# Patient Record
Sex: MALE | Race: WHITE | NOT HISPANIC OR LATINO | URBAN - METROPOLITAN AREA
[De-identification: names, ages, dates, MRNs, and addresses within clinical notes are randomized per-mention and may not be internally consistent; named-entity substitution may affect disease eponyms.]

---

## 2018-04-11 ENCOUNTER — EMERGENCY (EMERGENCY)
Facility: HOSPITAL | Age: 53
LOS: 1 days | Discharge: HOME | End: 2018-04-11
Attending: EMERGENCY MEDICINE
Payer: COMMERCIAL

## 2018-04-11 VITALS
OXYGEN SATURATION: 96 % | WEIGHT: 235.01 LBS | TEMPERATURE: 98 F | DIASTOLIC BLOOD PRESSURE: 82 MMHG | SYSTOLIC BLOOD PRESSURE: 122 MMHG | HEIGHT: 68.9 IN | RESPIRATION RATE: 20 BRPM | HEART RATE: 95 BPM

## 2018-04-11 VITALS
OXYGEN SATURATION: 99 % | RESPIRATION RATE: 16 BRPM | HEART RATE: 73 BPM | TEMPERATURE: 97 F | SYSTOLIC BLOOD PRESSURE: 111 MMHG | DIASTOLIC BLOOD PRESSURE: 62 MMHG

## 2018-04-11 PROCEDURE — 99283 EMERGENCY DEPT VISIT LOW MDM: CPT

## 2018-04-11 PROCEDURE — 90715 TDAP VACCINE 7 YRS/> IM: CPT

## 2018-04-11 PROCEDURE — 73630 X-RAY EXAM OF FOOT: CPT

## 2018-04-11 PROCEDURE — 73630 X-RAY EXAM OF FOOT: CPT | Mod: 26,LT

## 2018-04-11 PROCEDURE — 90471 IMMUNIZATION ADMIN: CPT

## 2018-04-11 PROCEDURE — 99284 EMERGENCY DEPT VISIT MOD MDM: CPT | Mod: 25

## 2018-04-11 PROCEDURE — 93005 ELECTROCARDIOGRAM TRACING: CPT

## 2018-04-11 PROCEDURE — 82962 GLUCOSE BLOOD TEST: CPT

## 2018-04-11 RX ORDER — MOXIFLOXACIN HYDROCHLORIDE TABLETS, 400 MG 400 MG/1
1 TABLET, FILM COATED ORAL
Qty: 14 | Refills: 0
Start: 2018-04-11 | End: 2018-04-17

## 2018-04-11 RX ORDER — SODIUM CHLORIDE 9 MG/ML
2000 INJECTION INTRAMUSCULAR; INTRAVENOUS; SUBCUTANEOUS ONCE
Qty: 0 | Refills: 0 | Status: COMPLETED | OUTPATIENT
Start: 2018-04-11 | End: 2018-04-11

## 2018-04-11 RX ORDER — TETANUS TOXOID, REDUCED DIPHTHERIA TOXOID AND ACELLULAR PERTUSSIS VACCINE, ADSORBED 5; 2.5; 8; 8; 2.5 [IU]/.5ML; [IU]/.5ML; UG/.5ML; UG/.5ML; UG/.5ML
0.5 SUSPENSION INTRAMUSCULAR ONCE
Qty: 0 | Refills: 0 | Status: COMPLETED | OUTPATIENT
Start: 2018-04-11 | End: 2018-04-11

## 2018-04-11 RX ORDER — IBUPROFEN 200 MG
1 TABLET ORAL
Qty: 30 | Refills: 0
Start: 2018-04-11 | End: 2018-04-20

## 2018-04-11 RX ORDER — CIPROFLOXACIN LACTATE 400MG/40ML
500 VIAL (ML) INTRAVENOUS EVERY 12 HOURS
Qty: 0 | Refills: 0 | Status: DISCONTINUED | OUTPATIENT
Start: 2018-04-11 | End: 2018-04-15

## 2018-04-11 RX ORDER — IBUPROFEN 200 MG
600 TABLET ORAL ONCE
Qty: 0 | Refills: 0 | Status: COMPLETED | OUTPATIENT
Start: 2018-04-11 | End: 2018-04-11

## 2018-04-11 RX ADMIN — Medication 600 MILLIGRAM(S): at 21:50

## 2018-04-11 RX ADMIN — Medication 600 MILLIGRAM(S): at 22:20

## 2018-04-11 RX ADMIN — SODIUM CHLORIDE 2000 MILLILITER(S): 9 INJECTION INTRAMUSCULAR; INTRAVENOUS; SUBCUTANEOUS at 22:01

## 2018-04-11 RX ADMIN — TETANUS TOXOID, REDUCED DIPHTHERIA TOXOID AND ACELLULAR PERTUSSIS VACCINE, ADSORBED 0.5 MILLILITER(S): 5; 2.5; 8; 8; 2.5 SUSPENSION INTRAMUSCULAR at 21:09

## 2018-04-11 RX ADMIN — Medication 500 MILLIGRAM(S): at 21:09

## 2018-04-11 NOTE — ED PROVIDER NOTE - PROGRESS NOTE DETAILS
Pt seen by podiatry, recommend dc with abx and close follow up. Precautions reviewed. Pt seen by podiatry, the resident was wrapping his foot when the pt had pain, started to sweat and felt weak (vasovagal episode.). He was layed back and given IV hydration and is feeling better, no chest pain, no sob, no nausea, no vomiting.

## 2018-04-11 NOTE — ED PROVIDER NOTE - MEDICAL DECISION MAKING DETAILS
Pt w/ puncture wound in foot s/p stepping on nail. Will get X-ray, soak foot in betadine, apply bacitracin, treat w/ abx since injury penetrating through shoe, and update tetanus.

## 2018-04-11 NOTE — ED PROVIDER NOTE - OBJECTIVE STATEMENT
52 y/o M w/ a PMHx of NIDDM c/o L foot pain x today. 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 No other complaints. NKDA.

## 2018-04-11 NOTE — CONSULT NOTE ADULT - SUBJECTIVE AND OBJECTIVE BOX
S : 53y year old Male seen in ED for left foot puncture wound. Pt states 6 hrs ago at work, he stepped on a nail which came right out of his foot. Pt states he did not do anything immediately after the event but came straight to ED. Pt states he is in a lot of pain. Pt states he is a diabetic however he does not measure his blood sugar daily and does not know his HbA1c.     Patient admits to  (+) Fevers, (+) Chills, (+) Nausea, (-) Vomiting, (-) Shortness of Breath      PMH: DM    PSH:No significant past surgical history      Allergies:No Known Allergies      Labs:      WBC Trend      Chem              T(F): 98.2 (04-11-18 @ 19:58), Max: 98.2 (04-11-18 @ 19:58)  HR: 70 (04-11-18 @ 21:59) (70 - 95)  BP: 90/63 (04-11-18 @ 21:59) (90/63 - 122/82)  RR: 18 (04-11-18 @ 21:59) (18 - 20)  SpO2: 99% (04-11-18 @ 21:59) (96% - 99%)  Wt(kg): --    O:   General: Pleasant  male NAD & AOX3.    Integument:  Skin warm, dry and supple bilateral.    Left foot plantar submet 5 puncture wound: measures 0.3cm x 0.3cmx 0.3cm; no purulence, mild erythema, mild edema, no drainage.  Vascular: Dorsalis Pedis and Posterior Tibial pulses 1/4.  Capillary re-fill time less then 3 seconds digits 1-5 bilateral.    Neuro: Protective sensation intact/diminished to the level of the digits bilateral.    A: Left submet 5 puncture wound      P:   Chart reviewed and Patient evaluated  Discussed diagnosis and treatment with patient  X-rays reviewed, final report pending  Wound flush with betadine  Applied bacitracin and DSD  Rx Ciprofloxacin 7 days BID  Rx Tetanus  Heel weight bearing to left foot  Pt will be monitored, if constitutional symptoms persist, please admit to medicine  If consitutional symptoms resolve, pt to follow-up at [address] [facility] next Tuesday am.  Discussed importance of daily foot examinations and proper shoe gear and to importance of lower Fasting Blood Glucose levels.   Pt educated on signs of infection and to f/u in ED if symptoms get worse  Discussed with Dr. Garcia

## 2019-06-20 ENCOUNTER — INPATIENT (INPATIENT)
Facility: HOSPITAL | Age: 54
LOS: 2 days | Discharge: HOME | DRG: 493 | End: 2019-06-23
Attending: ORTHOPAEDIC SURGERY | Admitting: ORTHOPAEDIC SURGERY
Payer: COMMERCIAL

## 2019-06-20 VITALS
HEART RATE: 77 BPM | TEMPERATURE: 98 F | SYSTOLIC BLOOD PRESSURE: 124 MMHG | RESPIRATION RATE: 19 BRPM | DIASTOLIC BLOOD PRESSURE: 83 MMHG | WEIGHT: 229.94 LBS | OXYGEN SATURATION: 97 %

## 2019-06-20 DIAGNOSIS — G25.81 RESTLESS LEGS SYNDROME: ICD-10-CM

## 2019-06-20 DIAGNOSIS — S82.843A DISPLACED BIMALLEOLAR FRACTURE OF UNSPECIFIED LOWER LEG, INITIAL ENCOUNTER FOR CLOSED FRACTURE: ICD-10-CM

## 2019-06-20 DIAGNOSIS — S82.899A OTHER FRACTURE OF UNSPECIFIED LOWER LEG, INITIAL ENCOUNTER FOR CLOSED FRACTURE: ICD-10-CM

## 2019-06-20 DIAGNOSIS — E11.9 TYPE 2 DIABETES MELLITUS WITHOUT COMPLICATIONS: ICD-10-CM

## 2019-06-20 LAB
ALBUMIN SERPL ELPH-MCNC: 3.4 G/DL — LOW (ref 3.5–5)
ALP SERPL-CCNC: 86 U/L — SIGNIFICANT CHANGE UP (ref 40–120)
ALT FLD-CCNC: 33 U/L DA — SIGNIFICANT CHANGE UP (ref 10–60)
ANION GAP SERPL CALC-SCNC: 9 MMOL/L — SIGNIFICANT CHANGE UP (ref 5–17)
APTT BLD: 32.1 SEC — SIGNIFICANT CHANGE UP (ref 27.5–36.3)
AST SERPL-CCNC: 18 U/L — SIGNIFICANT CHANGE UP (ref 10–40)
BASOPHILS # BLD AUTO: 0.04 K/UL — SIGNIFICANT CHANGE UP (ref 0–0.2)
BASOPHILS NFR BLD AUTO: 0.3 % — SIGNIFICANT CHANGE UP (ref 0–2)
BILIRUB SERPL-MCNC: 0.4 MG/DL — SIGNIFICANT CHANGE UP (ref 0.2–1.2)
BUN SERPL-MCNC: 16 MG/DL — SIGNIFICANT CHANGE UP (ref 7–18)
CALCIUM SERPL-MCNC: 8.7 MG/DL — SIGNIFICANT CHANGE UP (ref 8.4–10.5)
CHLORIDE SERPL-SCNC: 107 MMOL/L — SIGNIFICANT CHANGE UP (ref 96–108)
CO2 SERPL-SCNC: 22 MMOL/L — SIGNIFICANT CHANGE UP (ref 22–31)
CREAT SERPL-MCNC: 0.98 MG/DL — SIGNIFICANT CHANGE UP (ref 0.5–1.3)
EOSINOPHIL # BLD AUTO: 0.23 K/UL — SIGNIFICANT CHANGE UP (ref 0–0.5)
EOSINOPHIL NFR BLD AUTO: 2 % — SIGNIFICANT CHANGE UP (ref 0–6)
GLUCOSE BLDC GLUCOMTR-MCNC: 157 MG/DL — HIGH (ref 70–99)
GLUCOSE SERPL-MCNC: 119 MG/DL — HIGH (ref 70–99)
HCT VFR BLD CALC: 49.6 % — SIGNIFICANT CHANGE UP (ref 39–50)
HGB BLD-MCNC: 16.5 G/DL — SIGNIFICANT CHANGE UP (ref 13–17)
IMM GRANULOCYTES NFR BLD AUTO: 0.8 % — SIGNIFICANT CHANGE UP (ref 0–1.5)
INR BLD: 1.07 RATIO — SIGNIFICANT CHANGE UP (ref 0.88–1.16)
LYMPHOCYTES # BLD AUTO: 3.55 K/UL — HIGH (ref 1–3.3)
LYMPHOCYTES # BLD AUTO: 30.6 % — SIGNIFICANT CHANGE UP (ref 13–44)
MCHC RBC-ENTMCNC: 30.1 PG — SIGNIFICANT CHANGE UP (ref 27–34)
MCHC RBC-ENTMCNC: 33.3 GM/DL — SIGNIFICANT CHANGE UP (ref 32–36)
MCV RBC AUTO: 90.3 FL — SIGNIFICANT CHANGE UP (ref 80–100)
MONOCYTES # BLD AUTO: 0.86 K/UL — SIGNIFICANT CHANGE UP (ref 0–0.9)
MONOCYTES NFR BLD AUTO: 7.4 % — SIGNIFICANT CHANGE UP (ref 2–14)
NEUTROPHILS # BLD AUTO: 6.85 K/UL — SIGNIFICANT CHANGE UP (ref 1.8–7.4)
NEUTROPHILS NFR BLD AUTO: 58.9 % — SIGNIFICANT CHANGE UP (ref 43–77)
NRBC # BLD: 0 /100 WBCS — SIGNIFICANT CHANGE UP (ref 0–0)
PLATELET # BLD AUTO: 259 K/UL — SIGNIFICANT CHANGE UP (ref 150–400)
POTASSIUM SERPL-MCNC: 4.5 MMOL/L — SIGNIFICANT CHANGE UP (ref 3.5–5.3)
POTASSIUM SERPL-SCNC: 4.5 MMOL/L — SIGNIFICANT CHANGE UP (ref 3.5–5.3)
PROT SERPL-MCNC: 7.1 G/DL — SIGNIFICANT CHANGE UP (ref 6–8.3)
PROTHROM AB SERPL-ACNC: 11.9 SEC — SIGNIFICANT CHANGE UP (ref 10–12.9)
RBC # BLD: 5.49 M/UL — SIGNIFICANT CHANGE UP (ref 4.2–5.8)
RBC # FLD: 13.3 % — SIGNIFICANT CHANGE UP (ref 10.3–14.5)
SODIUM SERPL-SCNC: 138 MMOL/L — SIGNIFICANT CHANGE UP (ref 135–145)
WBC # BLD: 11.62 K/UL — HIGH (ref 3.8–10.5)
WBC # FLD AUTO: 11.62 K/UL — HIGH (ref 3.8–10.5)

## 2019-06-20 PROCEDURE — 73562 X-RAY EXAM OF KNEE 3: CPT | Mod: 26,LT

## 2019-06-20 PROCEDURE — 73700 CT LOWER EXTREMITY W/O DYE: CPT | Mod: 26,LT

## 2019-06-20 PROCEDURE — 99285 EMERGENCY DEPT VISIT HI MDM: CPT

## 2019-06-20 PROCEDURE — 73610 X-RAY EXAM OF ANKLE: CPT | Mod: 26,LT

## 2019-06-20 RX ORDER — GLUCAGON INJECTION, SOLUTION 0.5 MG/.1ML
1 INJECTION, SOLUTION SUBCUTANEOUS ONCE
Refills: 0 | Status: DISCONTINUED | OUTPATIENT
Start: 2019-06-20 | End: 2019-06-22

## 2019-06-20 RX ORDER — LANSOPRAZOLE 15 MG/1
1 CAPSULE, DELAYED RELEASE ORAL
Qty: 0 | Refills: 0 | DISCHARGE

## 2019-06-20 RX ORDER — INSULIN LISPRO 100/ML
VIAL (ML) SUBCUTANEOUS
Refills: 0 | Status: DISCONTINUED | OUTPATIENT
Start: 2019-06-20 | End: 2019-06-22

## 2019-06-20 RX ORDER — RANITIDINE HYDROCHLORIDE 150 MG/1
1 TABLET, FILM COATED ORAL
Qty: 0 | Refills: 0 | DISCHARGE

## 2019-06-20 RX ORDER — PRAMIPEXOLE DIHYDROCHLORIDE 0.12 MG/1
1 TABLET ORAL
Qty: 0 | Refills: 0 | DISCHARGE

## 2019-06-20 RX ORDER — OXYCODONE AND ACETAMINOPHEN 5; 325 MG/1; MG/1
1 TABLET ORAL EVERY 6 HOURS
Refills: 0 | Status: DISCONTINUED | OUTPATIENT
Start: 2019-06-20 | End: 2019-06-22

## 2019-06-20 RX ORDER — DAPAGLIFLOZIN AND METFORMIN HYDROCHLORIDE 10; 1000 MG/1; MG/1
1 TABLET, FILM COATED, EXTENDED RELEASE ORAL
Qty: 0 | Refills: 0 | DISCHARGE

## 2019-06-20 RX ORDER — DEXTROSE 50 % IN WATER 50 %
25 SYRINGE (ML) INTRAVENOUS ONCE
Refills: 0 | Status: DISCONTINUED | OUTPATIENT
Start: 2019-06-20 | End: 2019-06-22

## 2019-06-20 RX ORDER — LAMOTRIGINE 25 MG/1
2 TABLET, ORALLY DISINTEGRATING ORAL
Qty: 0 | Refills: 0 | DISCHARGE

## 2019-06-20 RX ORDER — SODIUM CHLORIDE 9 MG/ML
1000 INJECTION, SOLUTION INTRAVENOUS
Refills: 0 | Status: DISCONTINUED | OUTPATIENT
Start: 2019-06-20 | End: 2019-06-22

## 2019-06-20 RX ORDER — ACETAMINOPHEN 500 MG
1000 TABLET ORAL ONCE
Refills: 0 | Status: COMPLETED | OUTPATIENT
Start: 2019-06-20 | End: 2019-06-20

## 2019-06-20 RX ADMIN — Medication 1000 MILLIGRAM(S): at 22:41

## 2019-06-20 RX ADMIN — OXYCODONE AND ACETAMINOPHEN 1 TABLET(S): 5; 325 TABLET ORAL at 20:55

## 2019-06-20 RX ADMIN — OXYCODONE AND ACETAMINOPHEN 1 TABLET(S): 5; 325 TABLET ORAL at 21:30

## 2019-06-20 RX ADMIN — Medication 400 MILLIGRAM(S): at 21:52

## 2019-06-20 NOTE — ED ADULT NURSE NOTE - ED STAT RN HANDOFF DETAILS
Report given to LASHAUN Loredo, pt resting in bed, no acute distress noted, denies chest pain, no shortness of breath indicated. Safety maintained.

## 2019-06-20 NOTE — ED PROVIDER NOTE - OBJECTIVE STATEMENT
pt states he tripped on an uneven surface on Friday    and broke his ankle   seen by Dr. Coley who is recommending surgery

## 2019-06-20 NOTE — H&P ADULT - NSHPPHYSICALEXAM_GEN_ALL_CORE
Gen: NAD, laying comfortably in bed  LLE: Posterior splint with U application C/D/I, removal of splint- skin intact- (+) swelling to left ankle noted with ecchymosis around ankle, no erythema. (+) TTP to generalized ankle, moves all toes, SILT, 2+ pulses, compartments soft/compressible, calves soft/NT.

## 2019-06-20 NOTE — ED ADULT NURSE NOTE - OBJECTIVE STATEMENT
patient was sent by MD of admission for possible surgery on the left foot. Pt states he fell 5 days ago on the street. Dry dressing noted to left foot. Patient refused for dressing to removed at this time. No acute distress noted, denies chest pain, no shortness of breath indicated. Safety maintained.

## 2019-06-20 NOTE — ED PROVIDER NOTE - CLINICAL SUMMARY MEDICAL DECISION MAKING FREE TEXT BOX
pt with ankle fracture in splint   needs surgery for ankle fracture   will get labs, cxr, ankle xray preop labs and ekg   Ortho to consult and admit

## 2019-06-20 NOTE — H&P ADULT - HISTORY OF PRESENT ILLNESS
53 y/o M presented to the ED w/ left ankle fracture sustained s/p mechanical fall on 6/14/19. Pt states he tripped over a cement block and fell onto his left side and his L ankle buckled beneath him. Pt presented to [facility] where he was diagnosed with a L ankle fx- splinted and discharged. Pt then followed up with Dr. Coley today- who sent the patient in for surgical intervention. Pt denies open fracture. Ambulates independently at baseline.   Pt denies any numbness/tingling/CP/SOB.    PmHx: DM, Restless leg syndrome, GERD  All: NKDA- Allergic to wine/champagne

## 2019-06-20 NOTE — H&P ADULT - NSHPLABSRESULTS_GEN_ALL_CORE
16.5   11.62 )-----------( 259      ( 20 Jun 2019 15:11 )             49.6   06-20 @ 15:11    138  |  107  |  16  ----------------------------<  119  4.5   |  22  |  0.98      XR Left ankle: Fx of medial and lateral malleolus noted

## 2019-06-20 NOTE — ED ADULT NURSE NOTE - NSIMPLEMENTINTERV_GEN_ALL_ED
Implemented All Fall Risk Interventions:  [place] to call system. Call bell, personal items and telephone within reach. Instruct patient to call for assistance. Room bathroom lighting operational. Non-slip footwear when patient is off stretcher. Physically safe environment: no spills, clutter or unnecessary equipment. Stretcher in lowest position, wheels locked, appropriate side rails in place. Provide visual cue, wrist band, yellow gown, etc. Monitor gait and stability. Monitor for mental status changes and reorient to person, place, and time. Review medications for side effects contributing to fall risk. Reinforce activity limits and safety measures with patient and family.

## 2019-06-20 NOTE — H&P ADULT - PROBLEM SELECTOR PLAN 1
- D/w - will hold on surgery due to swelling of left ankle- ELevation/Ice to LLE- discussed importance with patient  - F/u CT scan of Left ankle  - NWB to LLE  - pain control  - DVT ppx  - Medical consult- Dr. Albright

## 2019-06-21 ENCOUNTER — TRANSCRIPTION ENCOUNTER (OUTPATIENT)
Age: 54
End: 2019-06-21

## 2019-06-21 DIAGNOSIS — Z96.7 PRESENCE OF OTHER BONE AND TENDON IMPLANTS: ICD-10-CM

## 2019-06-21 DIAGNOSIS — S82.899A OTHER FRACTURE OF UNSPECIFIED LOWER LEG, INITIAL ENCOUNTER FOR CLOSED FRACTURE: ICD-10-CM

## 2019-06-21 DIAGNOSIS — Z01.818 ENCOUNTER FOR OTHER PREPROCEDURAL EXAMINATION: ICD-10-CM

## 2019-06-21 LAB
GLUCOSE BLDC GLUCOMTR-MCNC: 106 MG/DL — HIGH (ref 70–99)
GLUCOSE BLDC GLUCOMTR-MCNC: 116 MG/DL — HIGH (ref 70–99)
GLUCOSE BLDC GLUCOMTR-MCNC: 128 MG/DL — HIGH (ref 70–99)
GLUCOSE BLDC GLUCOMTR-MCNC: 165 MG/DL — HIGH (ref 70–99)
HBA1C BLD-MCNC: 6.9 % — HIGH (ref 4–5.6)

## 2019-06-21 RX ORDER — SENNA PLUS 8.6 MG/1
2 TABLET ORAL AT BEDTIME
Refills: 0 | Status: DISCONTINUED | OUTPATIENT
Start: 2019-06-21 | End: 2019-06-22

## 2019-06-21 RX ORDER — PRAMIPEXOLE DIHYDROCHLORIDE 0.12 MG/1
0.5 TABLET ORAL DAILY
Refills: 0 | Status: DISCONTINUED | OUTPATIENT
Start: 2019-06-21 | End: 2019-06-22

## 2019-06-21 RX ORDER — MORPHINE SULFATE 50 MG/1
4 CAPSULE, EXTENDED RELEASE ORAL ONCE
Refills: 0 | Status: DISCONTINUED | OUTPATIENT
Start: 2019-06-21 | End: 2019-06-21

## 2019-06-21 RX ORDER — LAMOTRIGINE 25 MG/1
50 TABLET, ORALLY DISINTEGRATING ORAL
Refills: 0 | Status: DISCONTINUED | OUTPATIENT
Start: 2019-06-21 | End: 2019-06-22

## 2019-06-21 RX ORDER — PANTOPRAZOLE SODIUM 20 MG/1
40 TABLET, DELAYED RELEASE ORAL
Refills: 0 | Status: DISCONTINUED | OUTPATIENT
Start: 2019-06-21 | End: 2019-06-22

## 2019-06-21 RX ORDER — KETOROLAC TROMETHAMINE 30 MG/ML
30 SYRINGE (ML) INJECTION EVERY 6 HOURS
Refills: 0 | Status: DISCONTINUED | OUTPATIENT
Start: 2019-06-21 | End: 2019-06-22

## 2019-06-21 RX ORDER — ASPIRIN/CALCIUM CARB/MAGNESIUM 324 MG
81 TABLET ORAL DAILY
Refills: 0 | Status: DISCONTINUED | OUTPATIENT
Start: 2019-06-21 | End: 2019-06-22

## 2019-06-21 RX ORDER — ENOXAPARIN SODIUM 100 MG/ML
40 INJECTION SUBCUTANEOUS DAILY
Refills: 0 | Status: DISCONTINUED | OUTPATIENT
Start: 2019-06-21 | End: 2019-06-22

## 2019-06-21 RX ADMIN — OXYCODONE AND ACETAMINOPHEN 1 TABLET(S): 5; 325 TABLET ORAL at 06:05

## 2019-06-21 RX ADMIN — Medication 30 MILLIGRAM(S): at 19:37

## 2019-06-21 RX ADMIN — PRAMIPEXOLE DIHYDROCHLORIDE 0.5 MILLIGRAM(S): 0.12 TABLET ORAL at 16:44

## 2019-06-21 RX ADMIN — MORPHINE SULFATE 4 MILLIGRAM(S): 50 CAPSULE, EXTENDED RELEASE ORAL at 08:54

## 2019-06-21 RX ADMIN — LAMOTRIGINE 50 MILLIGRAM(S): 25 TABLET, ORALLY DISINTEGRATING ORAL at 17:49

## 2019-06-21 RX ADMIN — Medication 30 MILLIGRAM(S): at 20:00

## 2019-06-21 RX ADMIN — Medication 81 MILLIGRAM(S): at 17:49

## 2019-06-21 RX ADMIN — ENOXAPARIN SODIUM 40 MILLIGRAM(S): 100 INJECTION SUBCUTANEOUS at 17:50

## 2019-06-21 RX ADMIN — OXYCODONE AND ACETAMINOPHEN 1 TABLET(S): 5; 325 TABLET ORAL at 16:43

## 2019-06-21 RX ADMIN — MORPHINE SULFATE 4 MILLIGRAM(S): 50 CAPSULE, EXTENDED RELEASE ORAL at 09:44

## 2019-06-21 RX ADMIN — OXYCODONE AND ACETAMINOPHEN 1 TABLET(S): 5; 325 TABLET ORAL at 05:12

## 2019-06-21 RX ADMIN — OXYCODONE AND ACETAMINOPHEN 1 TABLET(S): 5; 325 TABLET ORAL at 17:48

## 2019-06-21 NOTE — PROGRESS NOTE ADULT - SUBJECTIVE AND OBJECTIVE BOX
Ortho Note   54yMale    Diagnosis:  S/p Left Ankle Fx    Patient is seen and evaluated at bedside; offers no acute complaints. Pain is mild; well controlled.  Awaiting surgery once ankle swelling decreases    Vital Signs Last 24 Hrs  T(C): 36.6 (21 Jun 2019 05:19), Max: 36.8 (20 Jun 2019 23:54)  T(F): 97.9 (21 Jun 2019 05:19), Max: 98.2 (20 Jun 2019 23:54)  HR: 61 (21 Jun 2019 05:19) (61 - 79)  BP: 121/72 (21 Jun 2019 05:19) (113/69 - 124/83)  BP(mean): 80 (20 Jun 2019 19:15) (80 - 80)  RR: 17 (21 Jun 2019 05:19) (17 - 19)  SpO2: 96% (21 Jun 2019 05:19) (96% - 99%)  I&O's Detail      Physical Exam:    General: AAOx3, in NAD, resting comfortably in bed.    LLE :  L ankle in splint. + motion in all digits.  Sensation intact. 2+ pulse at RLE. No CT and soft calves at RLE                      No new labs      Impression:  54yMale S/p L Ankle Fx  Plan:  -  Continue pain management  -  DVT prophylaxis  -  NWB of LLE  -  Application of Ice and elevation of LLE  -  ORIF of L Ankle once patients swelling decreases  -  Case discussed with Dr Coley Ortho Note   54yMale    Diagnosis:  S/p Left Ankle Fx    Patient is seen and evaluated at bedside; offers no acute complaints. Pain is mild; well controlled.  Awaiting surgery once ankle swelling decreases    Vital Signs Last 24 Hrs  T(C): 36.6 (21 Jun 2019 05:19), Max: 36.8 (20 Jun 2019 23:54)  T(F): 97.9 (21 Jun 2019 05:19), Max: 98.2 (20 Jun 2019 23:54)  HR: 61 (21 Jun 2019 05:19) (61 - 79)  BP: 121/72 (21 Jun 2019 05:19) (113/69 - 124/83)  BP(mean): 80 (20 Jun 2019 19:15) (80 - 80)  RR: 17 (21 Jun 2019 05:19) (17 - 19)  SpO2: 96% (21 Jun 2019 05:19) (96% - 99%)  I&O's Detail      Physical Exam:    General: AAOx3, in NAD, resting comfortably in bed.    LLE :  L ankle in splint. + motion in all digits.  Sensation intact. 2+ pulse at RLE. No CT and soft calves at RLE                      No new labs      Impression:  54yMale S/p L Ankle Fx  Plan:  -  Continue pain management  -  DVT prophylaxis  -  NWB of LLE  -  Application of Ice and elevation of LLE  -  ORIF of L Ankle once patients swelling decreases  -  Left ankle to swollen for surgery, Monitor for compartment syndrome.   >Continue to keep LLE elevated on 3 pillows and ice  -  Case discussed with Dr Coley

## 2019-06-21 NOTE — CONSULT NOTE ADULT - SUBJECTIVE AND OBJECTIVE BOX
Chief Complaint:    HPI:  53 y/o M presented to the ED w/ left ankle fracture sustained s/p mechanical fall on 6/14/19. Pt states he tripped over a cement block and fell onto his left side and his L ankle buckled beneath him. Pt presented to [facility] where he was diagnosed with a L ankle fx- splinted and discharged. Pt then followed up with Dr. Coley today- who sent the patient in for surgical intervention. Pt denies open fracture. Ambulates independently at baseline.   Pt denies any numbness/tingling/CP/SOB.    PmHx: DM, Restless leg syndrome, GERD  All: NKDA- Allergic to wine/champagne (20 Jun 2019 18:54)      Pain Level:   Scale: VAS    PAST MEDICAL & SURGICAL HISTORY:  Chronic GERD  Restless leg syndrome  Diabetes mellitus  No significant past surgical history      FAMILY HISTORY:      SOCIAL HISTORY:  [ ] Denies Smoking, Alcohol, or Drug Use    Allergies    No Known Allergies    Intolerances        PAIN MEDICATIONS:  ketorolac   Injectable 30 milliGRAM(s) IV Push every 6 hours PRN  oxyCODONE    5 mG/acetaminophen 325 mG 1 Tablet(s) Oral every 6 hours PRN      Heme:    Antibiotics:    Cardiovascular:    GI:    Endocrine:  dextrose 50% Injectable 25 Gram(s) IV Push once  dextrose 50% Injectable 25 Gram(s) IV Push once  glucagon  Injectable 1 milliGRAM(s) IntraMuscular once PRN  insulin lispro (HumaLOG) corrective regimen sliding scale   SubCutaneous three times a day before meals    All Other Medications:  dextrose 5%. 1000 milliLiter(s) IV Continuous <Continuous>      REVIEW OF SYSTEMS:    CONSTITUTIONAL: No fever, weight loss, or fatigue  RESPIRATORY: No cough, wheezing, chills, or hemoptysis, No SOB  NECK: No neck pain  CARDIOVASCULAR: No chest pain, palpitations, dizziness, or leg swelling  GASTROINTESTINAL: No abdominal or epigastric pain.  No nausea, vomiting, or hematemesis.  No diarrhea. + constipation.  GENITOURINARY: No dysuria, frequency, hematuria or incontinence  NEUROLOGICAL: No headaches, memory loss, loss of strength, numbness or tremors  MUSCULOSKELETAL: No joint pain or swelling, No muscle or back pain    Vital Signs Last 24 Hrs  T(C): 36.7 (21 Jun 2019 14:40), Max: 36.8 (20 Jun 2019 23:54)  T(F): 98 (21 Jun 2019 14:40), Max: 98.2 (20 Jun 2019 23:54)  HR: 67 (21 Jun 2019 14:40) (61 - 67)  BP: 124/62 (21 Jun 2019 14:40) (113/69 - 124/62)  BP(mean): 80 (20 Jun 2019 19:15) (80 - 80)  RR: 17 (21 Jun 2019 14:40) (17 - 18)  SpO2: 98% (21 Jun 2019 14:40) (96% - 98%)    PAIN SCORE:         SCALE USED: (1-10 VNRS)    PHYSICAL EXAM:    GENERAL: NAD, well-groomed, well-developed   NERVOUS SYSTEM: Alert and oriented x3, Motor strength 5/5 B/L upper and lower extremities, SLR -   CHEST/LUNG: Clear to percussion bilaterally, no rale, rhonchi or wheezing  HEART: Regular rate and rhythm, No murmurs, rubs, or gallops   ABDOMEN: Soft, nontender, nondistended, Bowel sounds present  BACK: No CVA tenderness, No paravetebral tenderness  EXTREMITIES: +2 periperal extremities, no edema, cyanosis + decreased rom     LABS:                          16.5   11.62 )-----------( 259      ( 20 Jun 2019 15:11 )             49.6     06-20    138  |  107  |  16  ----------------------------<  119<H>  4.5   |  22  |  0.98    Ca    8.7      20 Jun 2019 15:11    TPro  7.1  /  Alb  3.4<L>  /  TBili  0.4  /  DBili  x   /  AST  18  /  ALT  33  /  AlkPhos  86  06-20    PT/INR - ( 20 Jun 2019 15:11 )   PT: 11.9 sec;   INR: 1.07 ratio         PTT - ( 20 Jun 2019 15:11 )  PTT:32.1 sec      RADIOLOGY:    Drug Screen:        RECOMMENDATIONS    [ ]  [place]  Reviewed and Copied to Chart Chief Complaint: left foot pain    HPI:  55 y/o M presented to the ED w/ left ankle fracture sustained s/p mechanical fall on 6/14/19. Pt states he tripped over a cement block and fell onto his left side and his L ankle buckled beneath him. Pt presented to [facility] where he was diagnosed with a L ankle fx- splinted and discharged. Pt then followed up with Dr. Coley today- who sent the patient in for surgical intervention. Pt denies open fracture. Ambulates independently at baseline. Pt denies any numbness/tingling/CP/SOB. Pt s/p orif left fracture, pod#1.  + left ankle pain.     PmHx: DM, Restless leg syndrome, GERD  All: NKDA- Allergic to wine/champagne (20 Jun 2019 18:54)      PAST MEDICAL & SURGICAL HISTORY:  Chronic GERD  Restless leg syndrome  Diabetes mellitus  No significant past surgical history      FAMILY HISTORY:      SOCIAL HISTORY:  [ x] Denies Smoking, Alcohol, or Drug Use    Allergies    No Known Allergies    Intolerances        PAIN MEDICATIONS:  ketorolac   Injectable 30 milliGRAM(s) IV Push every 6 hours PRN  oxyCODONE    5 mG/acetaminophen 325 mG 1 Tablet(s) Oral every 6 hours PRN      Heme:    Antibiotics:    Cardiovascular:    GI:    Endocrine:  dextrose 50% Injectable 25 Gram(s) IV Push once  dextrose 50% Injectable 25 Gram(s) IV Push once  glucagon  Injectable 1 milliGRAM(s) IntraMuscular once PRN  insulin lispro (HumaLOG) corrective regimen sliding scale   SubCutaneous three times a day before meals    All Other Medications:  dextrose 5%. 1000 milliLiter(s) IV Continuous <Continuous>      REVIEW OF SYSTEMS:    CONSTITUTIONAL: No fever, weight loss, or fatigue  RESPIRATORY: No cough, wheezing, chills, or hemoptysis, No SOB  NECK: No neck pain  CARDIOVASCULAR: No chest pain, palpitations, dizziness, or leg swelling  GASTROINTESTINAL: No abdominal or epigastric pain.  No nausea, vomiting, or hematemesis.  No diarrhea. + constipation.  GENITOURINARY: No dysuria, frequency, hematuria or incontinence  NEUROLOGICAL: No headaches, memory loss, loss of strength, numbness or tremors  MUSCULOSKELETAL: + left foot pain     Vital Signs Last 24 Hrs  T(C): 36.7 (21 Jun 2019 14:40), Max: 36.8 (20 Jun 2019 23:54)  T(F): 98 (21 Jun 2019 14:40), Max: 98.2 (20 Jun 2019 23:54)  HR: 67 (21 Jun 2019 14:40) (61 - 67)  BP: 124/62 (21 Jun 2019 14:40) (113/69 - 124/62)  BP(mean): 80 (20 Jun 2019 19:15) (80 - 80)  RR: 17 (21 Jun 2019 14:40) (17 - 18)  SpO2: 98% (21 Jun 2019 14:40) (96% - 98%)    PAIN SCORE:     5/10    SCALE USED: (1-10 VNRS)    PHYSICAL EXAM:    GENERAL: NAD, well-groomed, well-developed   NERVOUS SYSTEM: Alert and oriented x3, Motor strength 5/5 B/L upper and lower extremities, SLR -   CHEST/LUNG: Clear to percussion bilaterally, no rale, rhonchi or wheezing  HEART: Regular rate and rhythm, No murmurs, rubs, or gallops   ABDOMEN: Soft, nontender, nondistended, Bowel sounds present  BACK: No CVA tenderness, No paravertebral tenderness  EXTREMITIES: +2 peripheral extremities, no edema, cyanosis + decreased rom  + left foot pain    LABS:                          16.5   11.62 )-----------( 259      ( 20 Jun 2019 15:11 )             49.6     06-20    138  |  107  |  16  ----------------------------<  119<H>  4.5   |  22  |  0.98    Ca    8.7      20 Jun 2019 15:11    TPro  7.1  /  Alb  3.4<L>  /  TBili  0.4  /  DBili  x   /  AST  18  /  ALT  33  /  AlkPhos  86  06-20    PT/INR - ( 20 Jun 2019 15:11 )   PT: 11.9 sec;   INR: 1.07 ratio         PTT - ( 20 Jun 2019 15:11 )  PTT:32.1 sec      RADIOLOGY:    Drug Screen:        RECOMMENDATIONS    [ ]  [place]  Reviewed and Copied to Chart Chief Complaint: left foot pain    HPI:  53 y/o M presented to the ED w/ left ankle fracture sustained s/p mechanical fall on 6/14/19. Pt states he tripped over a cement block and fell onto his left side and his L ankle buckled beneath him. Pt presented to [facility] where he was diagnosed with a L ankle fx- splinted and discharged. Pt then followed up with Dr. Coley today- who sent the patient in for surgical intervention. Pt denies open fracture. Ambulates independently at baseline. Pt denies any numbness/tingling/CP/SOB. Pt to undergo orif of fracture possible tomorrow.      PmHx: DM, Restless leg syndrome, GERD  All: NKDA- Allergic to wine/champagne (20 Jun 2019 18:54)      PAST MEDICAL & SURGICAL HISTORY:  Chronic GERD  Restless leg syndrome  Diabetes mellitus  No significant past surgical history      FAMILY HISTORY:      SOCIAL HISTORY:  [ x] Denies Smoking, Alcohol, or Drug Use    Allergies    No Known Allergies    Intolerances        PAIN MEDICATIONS:  ketorolac   Injectable 30 milliGRAM(s) IV Push every 6 hours PRN  oxyCODONE    5 mG/acetaminophen 325 mG 1 Tablet(s) Oral every 6 hours PRN      Heme:    Antibiotics:    Cardiovascular:    GI:    Endocrine:  dextrose 50% Injectable 25 Gram(s) IV Push once  dextrose 50% Injectable 25 Gram(s) IV Push once  glucagon  Injectable 1 milliGRAM(s) IntraMuscular once PRN  insulin lispro (HumaLOG) corrective regimen sliding scale   SubCutaneous three times a day before meals    All Other Medications:  dextrose 5%. 1000 milliLiter(s) IV Continuous <Continuous>      REVIEW OF SYSTEMS:    CONSTITUTIONAL: No fever, weight loss, or fatigue  RESPIRATORY: No cough, wheezing, chills, or hemoptysis, No SOB  NECK: No neck pain  CARDIOVASCULAR: No chest pain, palpitations, dizziness, or leg swelling  GASTROINTESTINAL: No abdominal or epigastric pain.  No nausea, vomiting, or hematemesis.  No diarrhea. + constipation.  GENITOURINARY: No dysuria, frequency, hematuria or incontinence  NEUROLOGICAL: No headaches, memory loss, loss of strength, numbness or tremors  MUSCULOSKELETAL: + left foot pain     Vital Signs Last 24 Hrs  T(C): 36.7 (21 Jun 2019 14:40), Max: 36.8 (20 Jun 2019 23:54)  T(F): 98 (21 Jun 2019 14:40), Max: 98.2 (20 Jun 2019 23:54)  HR: 67 (21 Jun 2019 14:40) (61 - 67)  BP: 124/62 (21 Jun 2019 14:40) (113/69 - 124/62)  BP(mean): 80 (20 Jun 2019 19:15) (80 - 80)  RR: 17 (21 Jun 2019 14:40) (17 - 18)  SpO2: 98% (21 Jun 2019 14:40) (96% - 98%)    PAIN SCORE:     5/10    SCALE USED: (1-10 VNRS)    PHYSICAL EXAM:    GENERAL: NAD, well-groomed, well-developed   NERVOUS SYSTEM: Alert and oriented x3, Motor strength 5/5 B/L upper and lower extremities, SLR -   CHEST/LUNG: Clear to percussion bilaterally, no rale, rhonchi or wheezing  HEART: Regular rate and rhythm, No murmurs, rubs, or gallops   ABDOMEN: Soft, nontender, nondistended, Bowel sounds present  BACK: No CVA tenderness, No paravertebral tenderness  EXTREMITIES: +2 peripheral extremities, no edema, cyanosis + decreased rom  + left foot wrapped    LABS:                          16.5   11.62 )-----------( 259      ( 20 Jun 2019 15:11 )             49.6     06-20    138  |  107  |  16  ----------------------------<  119<H>  4.5   |  22  |  0.98    Ca    8.7      20 Jun 2019 15:11    TPro  7.1  /  Alb  3.4<L>  /  TBili  0.4  /  DBili  x   /  AST  18  /  ALT  33  /  AlkPhos  86  06-20    PT/INR - ( 20 Jun 2019 15:11 )   PT: 11.9 sec;   INR: 1.07 ratio         PTT - ( 20 Jun 2019 15:11 )  PTT:32.1 sec      RADIOLOGY:    Drug Screen:        RECOMMENDATIONS    [ ]  [place]  Reviewed and Copied to Chart

## 2019-06-21 NOTE — CONSULT NOTE ADULT - PROBLEM SELECTOR RECOMMENDATION 9
-METS >4, RCRI score 0, patient is at low risk for scheduled procedure unless pre-op EKG shows abnormal/ischemic sign.  -Please obtain pre-op EKG before the surgery; nothing found in the chart. -METS >4, RCRI score 0, patient is at low risk for scheduled procedure.  -Pre-op EKG did not show any changes from the previous EKG. No ischemic change is noted.

## 2019-06-21 NOTE — CONSULT NOTE ADULT - SUBJECTIVE AND OBJECTIVE BOX
Asked to see Patient for evaluation of pre-op medical risk stratification.    HPI:  53 y/o M presented to the ED w/ left ankle fracture sustained s/p mechanical fall on 6/14/19. Pt states he tripped over a cement block and fell onto his left side and his L ankle buckled beneath him. Pt presented to [facility] where he was diagnosed with a L ankle fx- splinted and discharged. Pt then followed up with Dr. Coley today- who sent the patient in for surgical intervention. Pt denies open fracture. Ambulates independently at baseline.   Pt denies any numbness/tingling/CP/SOB.    PmHx: DM, Restless leg syndrome, GERD  All: NKDA- Allergic to wine/champagne (20 Jun 2019 18:54)    Medical consult was called for pre-op medical risk stratification      PAST MEDICAL & SURGICAL HISTORY:  Chronic GERD  Restless leg syndrome  Diabetes mellitus  No significant past surgical history      REVIEW OF SYSTEMS:    CONSTITUTIONAL: No fever, weight loss, or fatigue  EYES: No eye pain, visual disturbances, or discharge  ENMT:  No difficulty hearing, tinnitus, vertigo; No sinus or throat pain  NECK: No pain or stiffness  RESPIRATORY: No cough, wheezing, chills or hemoptysis; No shortness of breath  CARDIOVASCULAR: No chest pain, palpitations, dizziness, or leg swelling  GASTROINTESTINAL: No abdominal or epigastric pain. No nausea, vomiting, or hematemesis; No diarrhea or constipation. No melena or hematochezia.  GENITOURINARY: No dysuria, frequency, hematuria, or incontinence  NEUROLOGICAL: No headaches, memory loss, loss of strength, numbness, or tremors  SKIN: No itching, burning, rashes, or lesions   MUSCULOSKELETAL: No joint pain or swelling; No muscle, back. + left ankle pain        MEDICATIONS  (STANDING):  dextrose 5%. 1000 milliLiter(s) (50 mL/Hr) IV Continuous <Continuous>  dextrose 50% Injectable 25 Gram(s) IV Push once  dextrose 50% Injectable 25 Gram(s) IV Push once  insulin lispro (HumaLOG) corrective regimen sliding scale   SubCutaneous three times a day before meals    MEDICATIONS  (PRN):  glucagon  Injectable 1 milliGRAM(s) IntraMuscular once PRN Glucose LESS THAN 70 milligrams/deciliter  oxyCODONE    5 mG/acetaminophen 325 mG 1 Tablet(s) Oral every 6 hours PRN Moderate Pain (4 - 6)      Allergies    No Known Allergies    Intolerances        SOCIAL HISTORY: current active smoker    FAMILY HISTORY:      Vital Signs Last 24 Hrs  T(C): 36.6 (21 Jun 2019 05:19), Max: 36.8 (20 Jun 2019 23:54)  T(F): 97.9 (21 Jun 2019 05:19), Max: 98.2 (20 Jun 2019 23:54)  HR: 61 (21 Jun 2019 05:19) (61 - 79)  BP: 121/72 (21 Jun 2019 05:19) (113/69 - 124/83)  BP(mean): 80 (20 Jun 2019 19:15) (80 - 80)  RR: 17 (21 Jun 2019 05:19) (17 - 19)  SpO2: 96% (21 Jun 2019 05:19) (96% - 99%)    PHYSICAL EXAM:    GENERAL: NAD, well-groomed, well-developed  HEAD:  Atraumatic, Normocephalic  EYES: EOMI, PERRLA, conjunctiva and sclera clear  ENMT: No tonsillar erythema, exudates, or enlargement; Moist mucous membranes, Good dentition, No lesions  NECK: Supple, No JVD, Normal thyroid  NERVOUS SYSTEM:  Alert & Oriented X3, Good concentration  CHEST/LUNG: Clear to percussion bilaterally; No rales, rhonchi, wheezing, or rubs  HEART: Regular rate and rhythm; No murmurs, rubs, or gallops  ABDOMEN: Soft, Nontender, Nondistended; Bowel sounds present  EXTREMITIES:  2+ Peripheral Pulses, No clubbing, cyanosis, or edema  SKIN: No rashes or lesions      LABS:                        16.5   11.62 )-----------( 259      ( 20 Jun 2019 15:11 )             49.6     06-20    138  |  107  |  16  ----------------------------<  119<H>  4.5   |  22  |  0.98    Ca    8.7      20 Jun 2019 15:11    TPro  7.1  /  Alb  3.4<L>  /  TBili  0.4  /  DBili  x   /  AST  18  /  ALT  33  /  AlkPhos  86  06-20    PT/INR - ( 20 Jun 2019 15:11 )   PT: 11.9 sec;   INR: 1.07 ratio         PTT - ( 20 Jun 2019 15:11 )  PTT:32.1 sec      RADIOLOGY & ADDITIONAL STUDIES:    EKG Reviewed: Pending Asked to see Patient for evaluation of pre-op medical risk stratification.    HPI:  55 y/o M presented to the ED w/ left ankle fracture sustained s/p mechanical fall on 6/14/19. Pt states he tripped over a cement block and fell onto his left side and his L ankle buckled beneath him. Pt presented to [facility] where he was diagnosed with a L ankle fx- splinted and discharged. Pt then followed up with Dr. Coley today- who sent the patient in for surgical intervention. Pt denies open fracture. Ambulates independently at baseline.   Pt denies any numbness/tingling/CP/SOB.    PmHx: DM, Restless leg syndrome, GERD  All: NKDA- Allergic to wine/champagne (20 Jun 2019 18:54)    Medical consult was called for pre-op medical risk stratification      PAST MEDICAL & SURGICAL HISTORY:  Chronic GERD  Restless leg syndrome  Diabetes mellitus  No significant past surgical history      REVIEW OF SYSTEMS:    CONSTITUTIONAL: No fever, weight loss, or fatigue  EYES: No eye pain, visual disturbances, or discharge  ENMT:  No difficulty hearing, tinnitus, vertigo; No sinus or throat pain  NECK: No pain or stiffness  RESPIRATORY: No cough, wheezing, chills or hemoptysis; No shortness of breath  CARDIOVASCULAR: No chest pain, palpitations, dizziness, or leg swelling  GASTROINTESTINAL: No abdominal or epigastric pain. No nausea, vomiting, or hematemesis; No diarrhea or constipation. No melena or hematochezia.  GENITOURINARY: No dysuria, frequency, hematuria, or incontinence  NEUROLOGICAL: No headaches, memory loss, loss of strength, numbness, or tremors  SKIN: No itching, burning, rashes, or lesions   MUSCULOSKELETAL: No joint pain or swelling; No muscle, back. + left ankle pain        MEDICATIONS  (STANDING):  dextrose 5%. 1000 milliLiter(s) (50 mL/Hr) IV Continuous <Continuous>  dextrose 50% Injectable 25 Gram(s) IV Push once  dextrose 50% Injectable 25 Gram(s) IV Push once  insulin lispro (HumaLOG) corrective regimen sliding scale   SubCutaneous three times a day before meals    MEDICATIONS  (PRN):  glucagon  Injectable 1 milliGRAM(s) IntraMuscular once PRN Glucose LESS THAN 70 milligrams/deciliter  oxyCODONE    5 mG/acetaminophen 325 mG 1 Tablet(s) Oral every 6 hours PRN Moderate Pain (4 - 6)      Allergies    No Known Allergies    Intolerances        SOCIAL HISTORY: current active smoker    FAMILY HISTORY:      Vital Signs Last 24 Hrs  T(C): 36.6 (21 Jun 2019 05:19), Max: 36.8 (20 Jun 2019 23:54)  T(F): 97.9 (21 Jun 2019 05:19), Max: 98.2 (20 Jun 2019 23:54)  HR: 61 (21 Jun 2019 05:19) (61 - 79)  BP: 121/72 (21 Jun 2019 05:19) (113/69 - 124/83)  BP(mean): 80 (20 Jun 2019 19:15) (80 - 80)  RR: 17 (21 Jun 2019 05:19) (17 - 19)  SpO2: 96% (21 Jun 2019 05:19) (96% - 99%)    PHYSICAL EXAM:    GENERAL: NAD, well-groomed, well-developed  HEAD:  Atraumatic, Normocephalic  EYES: EOMI, PERRLA, conjunctiva and sclera clear  ENMT: No tonsillar erythema, exudates, or enlargement; Moist mucous membranes, Good dentition, No lesions  NECK: Supple, No JVD, Normal thyroid  NERVOUS SYSTEM:  Alert & Oriented X3, Good concentration  CHEST/LUNG: Clear to percussion bilaterally; No rales, rhonchi, wheezing, or rubs  HEART: Regular rate and rhythm; No murmurs, rubs, or gallops  ABDOMEN: Soft, Nontender, Nondistended; Bowel sounds present  EXTREMITIES:  2+ Peripheral Pulses, No clubbing, cyanosis, or edema  SKIN: No rashes or lesions      LABS:                        16.5   11.62 )-----------( 259      ( 20 Jun 2019 15:11 )             49.6     06-20    138  |  107  |  16  ----------------------------<  119<H>  4.5   |  22  |  0.98    Ca    8.7      20 Jun 2019 15:11    TPro  7.1  /  Alb  3.4<L>  /  TBili  0.4  /  DBili  x   /  AST  18  /  ALT  33  /  AlkPhos  86  06-20    PT/INR - ( 20 Jun 2019 15:11 )   PT: 11.9 sec;   INR: 1.07 ratio         PTT - ( 20 Jun 2019 15:11 )  PTT:32.1 sec      RADIOLOGY & ADDITIONAL STUDIES:    EKG Reviewed: reviewed

## 2019-06-21 NOTE — PROGRESS NOTE ADULT - ATTENDING COMMENTS
pt seen and agree.  recommend orif l distal tib / fib fracture.  ice / elevate splint. pt seen and agree.  recommend orif l distal tib / fib fracture.  ice / elevate splint.    explained risks / benefits and alternatives to the patent including but not limited to bleeding, infection, nerve damage, non union, wound dehisence, failure of fixation, blood clots, heart attack, stroke, and death.  pt understands and wishes to proceed.

## 2019-06-21 NOTE — CONSULT NOTE ADULT - PROBLEM SELECTOR RECOMMENDATION 9
- toradol Iv prn  - percocet po prn  - stool softeners  - oob - toradol Iv prn - use sparingly - pt for surger  - percocet po prn  - stool softeners  - oob  - surgery tomorrow

## 2019-06-22 ENCOUNTER — RESULT REVIEW (OUTPATIENT)
Age: 54
End: 2019-06-22

## 2019-06-22 DIAGNOSIS — M25.572 PAIN IN LEFT ANKLE AND JOINTS OF LEFT FOOT: ICD-10-CM

## 2019-06-22 LAB
BLD GP AB SCN SERPL QL: SIGNIFICANT CHANGE UP
GLUCOSE BLDC GLUCOMTR-MCNC: 119 MG/DL — HIGH (ref 70–99)
GLUCOSE BLDC GLUCOMTR-MCNC: 121 MG/DL — HIGH (ref 70–99)
GLUCOSE BLDC GLUCOMTR-MCNC: 125 MG/DL — HIGH (ref 70–99)
GLUCOSE BLDC GLUCOMTR-MCNC: 150 MG/DL — HIGH (ref 70–99)

## 2019-06-22 PROCEDURE — 20900 REMOVAL OF BONE FOR GRAFT: CPT | Mod: AS

## 2019-06-22 PROCEDURE — 27625 REMOVE ANKLE JOINT LINING: CPT | Mod: AS,59,LT

## 2019-06-22 PROCEDURE — 27828 TREAT LOWER LEG FRACTURE: CPT | Mod: AS,LT

## 2019-06-22 PROCEDURE — 27658 REPAIR OF LEG TENDON EACH: CPT | Mod: AS,59,LT

## 2019-06-22 PROCEDURE — 88311 DECALCIFY TISSUE: CPT | Mod: 26

## 2019-06-22 PROCEDURE — 88307 TISSUE EXAM BY PATHOLOGIST: CPT | Mod: 26

## 2019-06-22 PROCEDURE — 76000 FLUOROSCOPY <1 HR PHYS/QHP: CPT | Mod: 26

## 2019-06-22 RX ORDER — SODIUM CHLORIDE 9 MG/ML
1000 INJECTION INTRAMUSCULAR; INTRAVENOUS; SUBCUTANEOUS
Refills: 0 | Status: DISCONTINUED | OUTPATIENT
Start: 2019-06-22 | End: 2019-06-23

## 2019-06-22 RX ORDER — DEXAMETHASONE 0.5 MG/5ML
8 ELIXIR ORAL ONCE
Refills: 0 | Status: DISCONTINUED | OUTPATIENT
Start: 2019-06-22 | End: 2019-06-22

## 2019-06-22 RX ORDER — OXYCODONE HYDROCHLORIDE 5 MG/1
10 TABLET ORAL EVERY 6 HOURS
Refills: 0 | Status: DISCONTINUED | OUTPATIENT
Start: 2019-06-22 | End: 2019-06-23

## 2019-06-22 RX ORDER — KETOROLAC TROMETHAMINE 30 MG/ML
30 SYRINGE (ML) INJECTION ONCE
Refills: 0 | Status: DISCONTINUED | OUTPATIENT
Start: 2019-06-22 | End: 2019-06-22

## 2019-06-22 RX ORDER — DOCUSATE SODIUM 100 MG
100 CAPSULE ORAL THREE TIMES A DAY
Refills: 0 | Status: DISCONTINUED | OUTPATIENT
Start: 2019-06-22 | End: 2019-06-23

## 2019-06-22 RX ORDER — DEXTROSE 50 % IN WATER 50 %
12.5 SYRINGE (ML) INTRAVENOUS ONCE
Refills: 0 | Status: DISCONTINUED | OUTPATIENT
Start: 2019-06-22 | End: 2019-06-23

## 2019-06-22 RX ORDER — INSULIN LISPRO 100/ML
VIAL (ML) SUBCUTANEOUS
Refills: 0 | Status: DISCONTINUED | OUTPATIENT
Start: 2019-06-22 | End: 2019-06-23

## 2019-06-22 RX ORDER — LAMOTRIGINE 25 MG/1
50 TABLET, ORALLY DISINTEGRATING ORAL
Refills: 0 | Status: DISCONTINUED | OUTPATIENT
Start: 2019-06-22 | End: 2019-06-23

## 2019-06-22 RX ORDER — ONDANSETRON 8 MG/1
4 TABLET, FILM COATED ORAL ONCE
Refills: 0 | Status: DISCONTINUED | OUTPATIENT
Start: 2019-06-22 | End: 2019-06-22

## 2019-06-22 RX ORDER — DEXTROSE 50 % IN WATER 50 %
15 SYRINGE (ML) INTRAVENOUS ONCE
Refills: 0 | Status: DISCONTINUED | OUTPATIENT
Start: 2019-06-22 | End: 2019-06-23

## 2019-06-22 RX ORDER — SENNA PLUS 8.6 MG/1
2 TABLET ORAL AT BEDTIME
Refills: 0 | Status: DISCONTINUED | OUTPATIENT
Start: 2019-06-22 | End: 2019-06-23

## 2019-06-22 RX ORDER — ASPIRIN/CALCIUM CARB/MAGNESIUM 324 MG
81 TABLET ORAL DAILY
Refills: 0 | Status: DISCONTINUED | OUTPATIENT
Start: 2019-06-22 | End: 2019-06-23

## 2019-06-22 RX ORDER — KETOROLAC TROMETHAMINE 30 MG/ML
30 SYRINGE (ML) INJECTION EVERY 6 HOURS
Refills: 0 | Status: DISCONTINUED | OUTPATIENT
Start: 2019-06-22 | End: 2019-06-23

## 2019-06-22 RX ORDER — GLUCAGON INJECTION, SOLUTION 0.5 MG/.1ML
1 INJECTION, SOLUTION SUBCUTANEOUS ONCE
Refills: 0 | Status: DISCONTINUED | OUTPATIENT
Start: 2019-06-22 | End: 2019-06-23

## 2019-06-22 RX ORDER — ACETAMINOPHEN 500 MG
650 TABLET ORAL EVERY 6 HOURS
Refills: 0 | Status: DISCONTINUED | OUTPATIENT
Start: 2019-06-22 | End: 2019-06-23

## 2019-06-22 RX ORDER — SODIUM CHLORIDE 9 MG/ML
1000 INJECTION, SOLUTION INTRAVENOUS
Refills: 0 | Status: DISCONTINUED | OUTPATIENT
Start: 2019-06-22 | End: 2019-06-22

## 2019-06-22 RX ORDER — PRAMIPEXOLE DIHYDROCHLORIDE 0.12 MG/1
0.5 TABLET ORAL DAILY
Refills: 0 | Status: DISCONTINUED | OUTPATIENT
Start: 2019-06-22 | End: 2019-06-23

## 2019-06-22 RX ORDER — HYDROMORPHONE HYDROCHLORIDE 2 MG/ML
0.5 INJECTION INTRAMUSCULAR; INTRAVENOUS; SUBCUTANEOUS EVERY 4 HOURS
Refills: 0 | Status: DISCONTINUED | OUTPATIENT
Start: 2019-06-22 | End: 2019-06-23

## 2019-06-22 RX ORDER — ENOXAPARIN SODIUM 100 MG/ML
40 INJECTION SUBCUTANEOUS DAILY
Refills: 0 | Status: DISCONTINUED | OUTPATIENT
Start: 2019-06-23 | End: 2019-06-23

## 2019-06-22 RX ORDER — ONDANSETRON 8 MG/1
4 TABLET, FILM COATED ORAL EVERY 6 HOURS
Refills: 0 | Status: DISCONTINUED | OUTPATIENT
Start: 2019-06-22 | End: 2019-06-23

## 2019-06-22 RX ORDER — ACETAMINOPHEN 500 MG
1000 TABLET ORAL ONCE
Refills: 0 | Status: COMPLETED | OUTPATIENT
Start: 2019-06-22 | End: 2019-06-22

## 2019-06-22 RX ORDER — FAMOTIDINE 10 MG/ML
20 INJECTION INTRAVENOUS EVERY 12 HOURS
Refills: 0 | Status: DISCONTINUED | OUTPATIENT
Start: 2019-06-22 | End: 2019-06-23

## 2019-06-22 RX ORDER — PANTOPRAZOLE SODIUM 20 MG/1
40 TABLET, DELAYED RELEASE ORAL
Refills: 0 | Status: DISCONTINUED | OUTPATIENT
Start: 2019-06-22 | End: 2019-06-23

## 2019-06-22 RX ORDER — CEFAZOLIN SODIUM 1 G
2000 VIAL (EA) INJECTION EVERY 8 HOURS
Refills: 0 | Status: COMPLETED | OUTPATIENT
Start: 2019-06-22 | End: 2019-06-23

## 2019-06-22 RX ORDER — OXYCODONE AND ACETAMINOPHEN 5; 325 MG/1; MG/1
1 TABLET ORAL EVERY 6 HOURS
Refills: 0 | Status: DISCONTINUED | OUTPATIENT
Start: 2019-06-22 | End: 2019-06-23

## 2019-06-22 RX ORDER — HYDROMORPHONE HYDROCHLORIDE 2 MG/ML
0.5 INJECTION INTRAMUSCULAR; INTRAVENOUS; SUBCUTANEOUS
Refills: 0 | Status: DISCONTINUED | OUTPATIENT
Start: 2019-06-22 | End: 2019-06-22

## 2019-06-22 RX ADMIN — Medication 100 MILLIGRAM(S): at 21:12

## 2019-06-22 RX ADMIN — PANTOPRAZOLE SODIUM 40 MILLIGRAM(S): 20 TABLET, DELAYED RELEASE ORAL at 05:28

## 2019-06-22 RX ADMIN — OXYCODONE AND ACETAMINOPHEN 1 TABLET(S): 5; 325 TABLET ORAL at 00:09

## 2019-06-22 RX ADMIN — OXYCODONE AND ACETAMINOPHEN 1 TABLET(S): 5; 325 TABLET ORAL at 00:37

## 2019-06-22 RX ADMIN — OXYCODONE HYDROCHLORIDE 10 MILLIGRAM(S): 5 TABLET ORAL at 17:56

## 2019-06-22 RX ADMIN — Medication 30 MILLIGRAM(S): at 15:41

## 2019-06-22 RX ADMIN — Medication 30 MILLIGRAM(S): at 05:29

## 2019-06-22 RX ADMIN — HYDROMORPHONE HYDROCHLORIDE 0.5 MILLIGRAM(S): 2 INJECTION INTRAMUSCULAR; INTRAVENOUS; SUBCUTANEOUS at 21:11

## 2019-06-22 RX ADMIN — HYDROMORPHONE HYDROCHLORIDE 0.5 MILLIGRAM(S): 2 INJECTION INTRAMUSCULAR; INTRAVENOUS; SUBCUTANEOUS at 13:45

## 2019-06-22 RX ADMIN — HYDROMORPHONE HYDROCHLORIDE 0.5 MILLIGRAM(S): 2 INJECTION INTRAMUSCULAR; INTRAVENOUS; SUBCUTANEOUS at 13:36

## 2019-06-22 RX ADMIN — Medication 1000 MILLIGRAM(S): at 13:44

## 2019-06-22 RX ADMIN — LAMOTRIGINE 50 MILLIGRAM(S): 25 TABLET, ORALLY DISINTEGRATING ORAL at 05:28

## 2019-06-22 RX ADMIN — HYDROMORPHONE HYDROCHLORIDE 0.5 MILLIGRAM(S): 2 INJECTION INTRAMUSCULAR; INTRAVENOUS; SUBCUTANEOUS at 21:30

## 2019-06-22 RX ADMIN — FAMOTIDINE 20 MILLIGRAM(S): 10 INJECTION INTRAVENOUS at 18:43

## 2019-06-22 RX ADMIN — Medication 400 MILLIGRAM(S): at 13:26

## 2019-06-22 RX ADMIN — Medication 30 MILLIGRAM(S): at 06:00

## 2019-06-22 RX ADMIN — OXYCODONE HYDROCHLORIDE 10 MILLIGRAM(S): 5 TABLET ORAL at 18:56

## 2019-06-22 RX ADMIN — Medication 30 MILLIGRAM(S): at 19:00

## 2019-06-22 RX ADMIN — Medication 30 MILLIGRAM(S): at 18:41

## 2019-06-22 RX ADMIN — Medication 30 MILLIGRAM(S): at 15:26

## 2019-06-22 RX ADMIN — LAMOTRIGINE 50 MILLIGRAM(S): 25 TABLET, ORALLY DISINTEGRATING ORAL at 18:41

## 2019-06-22 RX ADMIN — SENNA PLUS 2 TABLET(S): 8.6 TABLET ORAL at 21:12

## 2019-06-22 NOTE — PROGRESS NOTE ADULT - SUBJECTIVE AND OBJECTIVE BOX
LUCILA LONGORIA  [id number]  54y    Orthopedic PACU Note    Dx: S/p ORIF Lt ankle fx POD#0    Pt tolerated procedure well.  No acute events.    Denies cp/sob/dyspnea    T(C): 37 (06-22-19 @ 13:26), Max: 37 (06-22-19 @ 13:26)  HR: 71 (06-22-19 @ 13:41) (61 - 87)  BP: 132/85 (06-22-19 @ 13:41) (119/65 - 139/85)  RR: 15 (06-22-19 @ 13:41) (15 - 17)  SpO2: 99% (06-22-19 @ 13:41) (95% - 99%)    PE:   LLE splint intact, ao splint  no ct  good cap refill  ankle joint immobilized  from of toes  silt nvi  skin pink and warm    Labs:  n/a    Impression: 55 yo m s/p ORIF L ankle fx POD#0  Plan:  - Admit to: Ortho- Dr Coley  - Condition: Stable  - Pain control  - dvt ppx- lovenox at 1:30am on 6/23/2019  - daily pt: NWB of the left ankle  - post-op abx x24hrs  - incentive spirometry  - Transfer to: Surgical floor  - case dw dr coley

## 2019-06-23 ENCOUNTER — TRANSCRIPTION ENCOUNTER (OUTPATIENT)
Age: 54
End: 2019-06-23

## 2019-06-23 VITALS
HEART RATE: 70 BPM | RESPIRATION RATE: 16 BRPM | SYSTOLIC BLOOD PRESSURE: 126 MMHG | OXYGEN SATURATION: 95 % | DIASTOLIC BLOOD PRESSURE: 65 MMHG | TEMPERATURE: 99 F

## 2019-06-23 LAB
ANION GAP SERPL CALC-SCNC: 7 MMOL/L — SIGNIFICANT CHANGE UP (ref 5–17)
BASOPHILS # BLD AUTO: 0.03 K/UL — SIGNIFICANT CHANGE UP (ref 0–0.2)
BASOPHILS NFR BLD AUTO: 0.3 % — SIGNIFICANT CHANGE UP (ref 0–2)
BUN SERPL-MCNC: 17 MG/DL — SIGNIFICANT CHANGE UP (ref 7–18)
CALCIUM SERPL-MCNC: 10.3 MG/DL — SIGNIFICANT CHANGE UP (ref 8.4–10.5)
CHLORIDE SERPL-SCNC: 102 MMOL/L — SIGNIFICANT CHANGE UP (ref 96–108)
CO2 SERPL-SCNC: 29 MMOL/L — SIGNIFICANT CHANGE UP (ref 22–31)
CREAT SERPL-MCNC: 0.97 MG/DL — SIGNIFICANT CHANGE UP (ref 0.5–1.3)
EOSINOPHIL # BLD AUTO: 0.09 K/UL — SIGNIFICANT CHANGE UP (ref 0–0.5)
EOSINOPHIL NFR BLD AUTO: 0.9 % — SIGNIFICANT CHANGE UP (ref 0–6)
GLUCOSE BLDC GLUCOMTR-MCNC: 114 MG/DL — HIGH (ref 70–99)
GLUCOSE BLDC GLUCOMTR-MCNC: 143 MG/DL — HIGH (ref 70–99)
GLUCOSE SERPL-MCNC: 126 MG/DL — HIGH (ref 70–99)
HCT VFR BLD CALC: 44.1 % — SIGNIFICANT CHANGE UP (ref 39–50)
HGB BLD-MCNC: 14.3 G/DL — SIGNIFICANT CHANGE UP (ref 13–17)
IMM GRANULOCYTES NFR BLD AUTO: 0.4 % — SIGNIFICANT CHANGE UP (ref 0–1.5)
LYMPHOCYTES # BLD AUTO: 2.02 K/UL — SIGNIFICANT CHANGE UP (ref 1–3.3)
LYMPHOCYTES # BLD AUTO: 21.1 % — SIGNIFICANT CHANGE UP (ref 13–44)
MCHC RBC-ENTMCNC: 29.8 PG — SIGNIFICANT CHANGE UP (ref 27–34)
MCHC RBC-ENTMCNC: 32.4 GM/DL — SIGNIFICANT CHANGE UP (ref 32–36)
MCV RBC AUTO: 91.9 FL — SIGNIFICANT CHANGE UP (ref 80–100)
MONOCYTES # BLD AUTO: 0.9 K/UL — SIGNIFICANT CHANGE UP (ref 0–0.9)
MONOCYTES NFR BLD AUTO: 9.4 % — SIGNIFICANT CHANGE UP (ref 2–14)
NEUTROPHILS # BLD AUTO: 6.48 K/UL — SIGNIFICANT CHANGE UP (ref 1.8–7.4)
NEUTROPHILS NFR BLD AUTO: 67.9 % — SIGNIFICANT CHANGE UP (ref 43–77)
NRBC # BLD: 0 /100 WBCS — SIGNIFICANT CHANGE UP (ref 0–0)
PLATELET # BLD AUTO: 270 K/UL — SIGNIFICANT CHANGE UP (ref 150–400)
POTASSIUM SERPL-MCNC: 4.7 MMOL/L — SIGNIFICANT CHANGE UP (ref 3.5–5.3)
POTASSIUM SERPL-SCNC: 4.7 MMOL/L — SIGNIFICANT CHANGE UP (ref 3.5–5.3)
RBC # BLD: 4.8 M/UL — SIGNIFICANT CHANGE UP (ref 4.2–5.8)
RBC # FLD: 13.1 % — SIGNIFICANT CHANGE UP (ref 10.3–14.5)
SODIUM SERPL-SCNC: 138 MMOL/L — SIGNIFICANT CHANGE UP (ref 135–145)
WBC # BLD: 9.56 K/UL — SIGNIFICANT CHANGE UP (ref 3.8–10.5)
WBC # FLD AUTO: 9.56 K/UL — SIGNIFICANT CHANGE UP (ref 3.8–10.5)

## 2019-06-23 RX ORDER — ASPIRIN/CALCIUM CARB/MAGNESIUM 324 MG
1 TABLET ORAL
Qty: 0 | Refills: 0 | DISCHARGE

## 2019-06-23 RX ORDER — GABAPENTIN 400 MG/1
1 CAPSULE ORAL
Qty: 21 | Refills: 0
Start: 2019-06-23 | End: 2019-06-29

## 2019-06-23 RX ORDER — DOCUSATE SODIUM 100 MG
1 CAPSULE ORAL
Qty: 18 | Refills: 0
Start: 2019-06-23 | End: 2019-06-28

## 2019-06-23 RX ORDER — ASPIRIN/CALCIUM CARB/MAGNESIUM 324 MG
1 TABLET ORAL
Qty: 60 | Refills: 0
Start: 2019-06-23 | End: 2019-07-22

## 2019-06-23 RX ORDER — CELECOXIB 200 MG/1
1 CAPSULE ORAL
Qty: 5 | Refills: 0
Start: 2019-06-23 | End: 2019-06-27

## 2019-06-23 RX ADMIN — LAMOTRIGINE 50 MILLIGRAM(S): 25 TABLET, ORALLY DISINTEGRATING ORAL at 05:25

## 2019-06-23 RX ADMIN — OXYCODONE HYDROCHLORIDE 10 MILLIGRAM(S): 5 TABLET ORAL at 09:20

## 2019-06-23 RX ADMIN — FAMOTIDINE 20 MILLIGRAM(S): 10 INJECTION INTRAVENOUS at 05:25

## 2019-06-23 RX ADMIN — HYDROMORPHONE HYDROCHLORIDE 0.5 MILLIGRAM(S): 2 INJECTION INTRAMUSCULAR; INTRAVENOUS; SUBCUTANEOUS at 03:49

## 2019-06-23 RX ADMIN — Medication 30 MILLIGRAM(S): at 02:00

## 2019-06-23 RX ADMIN — PANTOPRAZOLE SODIUM 40 MILLIGRAM(S): 20 TABLET, DELAYED RELEASE ORAL at 05:25

## 2019-06-23 RX ADMIN — ENOXAPARIN SODIUM 40 MILLIGRAM(S): 100 INJECTION SUBCUTANEOUS at 11:31

## 2019-06-23 RX ADMIN — HYDROMORPHONE HYDROCHLORIDE 0.5 MILLIGRAM(S): 2 INJECTION INTRAMUSCULAR; INTRAVENOUS; SUBCUTANEOUS at 07:39

## 2019-06-23 RX ADMIN — PRAMIPEXOLE DIHYDROCHLORIDE 0.5 MILLIGRAM(S): 0.12 TABLET ORAL at 11:30

## 2019-06-23 RX ADMIN — OXYCODONE HYDROCHLORIDE 10 MILLIGRAM(S): 5 TABLET ORAL at 09:44

## 2019-06-23 RX ADMIN — HYDROMORPHONE HYDROCHLORIDE 0.5 MILLIGRAM(S): 2 INJECTION INTRAMUSCULAR; INTRAVENOUS; SUBCUTANEOUS at 07:35

## 2019-06-23 RX ADMIN — HYDROMORPHONE HYDROCHLORIDE 0.5 MILLIGRAM(S): 2 INJECTION INTRAMUSCULAR; INTRAVENOUS; SUBCUTANEOUS at 08:00

## 2019-06-23 RX ADMIN — Medication 100 MILLIGRAM(S): at 05:26

## 2019-06-23 RX ADMIN — Medication 81 MILLIGRAM(S): at 11:31

## 2019-06-23 RX ADMIN — Medication 30 MILLIGRAM(S): at 01:11

## 2019-06-23 NOTE — DISCHARGE NOTE PROVIDER - NSDCCPCAREPLAN_GEN_ALL_CORE_FT
PRINCIPAL DISCHARGE DIAGNOSIS  Diagnosis: Acute left ankle pain  Assessment and Plan of Treatment: S/p LEFT ANKLE FX ORIF  Pain Management- See Attached Medication Reconciliation  Weight Bearing Status: NON WEIGHT BEARING LEFT ANKLE  Equipment needs: Commode, Walker  Dressing: Please keep bandage/dressing Clean, Dry, and Intact.  Incision Site: REMOVE SUTURES ON   Dvt prophylaxis: Aspirin 325mg po twice daily x30days. Then take 81mg aspirin after 325mg tablets of aspirin are finished.  PT/Occupational Therapy are Activities of Daily Living as appropriate  Follow up with Orthopedic Surgeon after STAPLES ARE REMOVED at:_[phone] DR OSUNA  Keep splint clean, dry and intact. Do Not Wet. Keep wound/bandage clean, dry and intact. Return to ER if Fever of 101 F or greater develops   Do not shower until ok with Dr Osuna who will discuss thi upon follow up appointment

## 2019-06-23 NOTE — DISCHARGE NOTE NURSING/CASE MANAGEMENT/SOCIAL WORK - NSDCDPATPORTLINK_GEN_ALL_CORE
You can access the [email or web address] Portal, offered by [facility], by registering with the following website: [email or web address]

## 2019-06-23 NOTE — DISCHARGE NOTE PROVIDER - CARE PROVIDER_API CALL
Roge Coley (MD)  Orthopaedic Surgery; Sports Medicine  [address], [address]  [place] [id number]  Phone: [phone]  Fax: [phone]  Follow Up Time:

## 2019-06-23 NOTE — PROGRESS NOTE ADULT - SUBJECTIVE AND OBJECTIVE BOX
Orthopedics    dx: S/p ORIF Lt ankle fx POD#1	    Pt seen and evaluated in hospital bed.   Pt with c/o pain last night of left ankle  left ankle pain improved right now and rated 2/10 with rest.  Pain controlled at this time  Pt agrees with going home today  Pt denies Chest pain, SOB, dyspnea, paresthesias, N/V/D, abdominal pain, syncope, or pain anywhere else.     Vital Signs Last 24 Hrs  T(C): 37 (23 Jun 2019 05:18), Max: 37.6 (22 Jun 2019 21:13)  T(F): 98.6 (23 Jun 2019 05:18), Max: 99.6 (22 Jun 2019 21:13)  HR: 70 (23 Jun 2019 05:18) (60 - 87)  BP: 117/73 (23 Jun 2019 05:18) (109/60 - 139/85)  BP(mean): 87 (22 Jun 2019 16:26) (70 - 96)  RR: 17 (23 Jun 2019 05:18) (15 - 18)  SpO2: 99% (23 Jun 2019 05:18) (88% - 100%)    PE:   LLE:  AO splint intact  skin pink and warm  LLE elevated on pillows  FROM toes intact with no pain with PROM  no ct  calf soft  good cap refill <2sec  toes warm     A: 53 yo m s/p orif left ankle fx pod#1  P:  - pain control  - dvt ppx  - pt: nwb of LLE with crutches  - post-op abx x24hrs (complete)  - incentive nilda  - case dw dr garg  - ASA 325mg po bid x 30 days when discharged at home  - pt educated with post-op management and all questions answered.

## 2019-06-26 LAB — SURGICAL PATHOLOGY STUDY: SIGNIFICANT CHANGE UP

## 2019-07-10 PROCEDURE — 86850 RBC ANTIBODY SCREEN: CPT

## 2019-07-10 PROCEDURE — 73610 X-RAY EXAM OF ANKLE: CPT

## 2019-07-10 PROCEDURE — 86900 BLOOD TYPING SEROLOGIC ABO: CPT

## 2019-07-10 PROCEDURE — 76000 FLUOROSCOPY <1 HR PHYS/QHP: CPT

## 2019-07-10 PROCEDURE — 80048 BASIC METABOLIC PNL TOTAL CA: CPT

## 2019-07-10 PROCEDURE — 85730 THROMBOPLASTIN TIME PARTIAL: CPT

## 2019-07-10 PROCEDURE — 85027 COMPLETE CBC AUTOMATED: CPT

## 2019-07-10 PROCEDURE — 80053 COMPREHEN METABOLIC PANEL: CPT

## 2019-07-10 PROCEDURE — 86901 BLOOD TYPING SEROLOGIC RH(D): CPT

## 2019-07-10 PROCEDURE — 73562 X-RAY EXAM OF KNEE 3: CPT

## 2019-07-10 PROCEDURE — 88311 DECALCIFY TISSUE: CPT

## 2019-07-10 PROCEDURE — 73700 CT LOWER EXTREMITY W/O DYE: CPT

## 2019-07-10 PROCEDURE — 93005 ELECTROCARDIOGRAM TRACING: CPT

## 2019-07-10 PROCEDURE — 85610 PROTHROMBIN TIME: CPT

## 2019-07-10 PROCEDURE — C1713: CPT

## 2019-07-10 PROCEDURE — 83036 HEMOGLOBIN GLYCOSYLATED A1C: CPT

## 2019-07-10 PROCEDURE — 82962 GLUCOSE BLOOD TEST: CPT

## 2019-07-10 PROCEDURE — 36415 COLL VENOUS BLD VENIPUNCTURE: CPT

## 2019-07-10 PROCEDURE — 88307 TISSUE EXAM BY PATHOLOGIST: CPT

## 2019-07-10 PROCEDURE — 99285 EMERGENCY DEPT VISIT HI MDM: CPT | Mod: 25

## 2019-08-13 ENCOUNTER — EMERGENCY (EMERGENCY)
Facility: HOSPITAL | Age: 54
LOS: 1 days | Discharge: HOME | End: 2019-08-13
Attending: EMERGENCY MEDICINE
Payer: COMMERCIAL

## 2019-08-13 VITALS
SYSTOLIC BLOOD PRESSURE: 123 MMHG | HEART RATE: 84 BPM | WEIGHT: 214.95 LBS | TEMPERATURE: 98 F | OXYGEN SATURATION: 97 % | DIASTOLIC BLOOD PRESSURE: 80 MMHG | RESPIRATION RATE: 20 BRPM | HEIGHT: 68 IN

## 2019-08-13 PROBLEM — E11.9 TYPE 2 DIABETES MELLITUS WITHOUT COMPLICATIONS: Chronic | Status: ACTIVE | Noted: 2019-06-20

## 2019-08-13 PROBLEM — G25.81 RESTLESS LEGS SYNDROME: Chronic | Status: ACTIVE | Noted: 2019-06-20

## 2019-08-13 PROBLEM — K21.9 GASTRO-ESOPHAGEAL REFLUX DISEASE WITHOUT ESOPHAGITIS: Chronic | Status: ACTIVE | Noted: 2019-06-20

## 2019-08-13 PROCEDURE — 99283 EMERGENCY DEPT VISIT LOW MDM: CPT

## 2019-08-13 PROCEDURE — 99284 EMERGENCY DEPT VISIT MOD MDM: CPT | Mod: 25

## 2019-08-13 PROCEDURE — 73610 X-RAY EXAM OF ANKLE: CPT

## 2019-08-13 PROCEDURE — 73610 X-RAY EXAM OF ANKLE: CPT | Mod: 26,LT

## 2019-08-13 NOTE — CONSULT NOTE ADULT - SUBJECTIVE AND OBJECTIVE BOX
Orthopedic Surgery Consult Note    53 y/o Male w/a hx of L ankle ORIF by Dr. Coley on 6/22/19. Pt was initially splinted and discharged the next day- he followed up with - last time was 2 weeks ago where the splint was removed and converted to a short leg cast. Pt states that for the past few days he has been having worsening L foot pain with associated tingling- denies any trauma/falls. Denies ambulating on the leg- he is NWB to LLE on crutches. Pt denies any tamia numbness/Cp/SOB/N/V/Fever/Chills.     Vital Signs Last 24 Hrs  T(C): 36.6 (08-13-19 @ 10:48), Max: 36.6 (08-13-19 @ 10:48)  T(F): 97.8 (08-13-19 @ 10:48), Max: 97.8 (08-13-19 @ 10:48)  HR: 84 (08-13-19 @ 10:48) (84 - 84)  BP: 123/80 (08-13-19 @ 10:48) (123/80 - 123/80)  BP(mean): --  RR: 20 (08-13-19 @ 10:48) (20 - 20)  SpO2: 97% (08-13-19 @ 10:48) (97% - 97%)    Physical Exam  Gen:      Imaging:  XR: L ankle: Three views of the left ankle show surgical plates with fixation screws project along both the distal medial tibia and the distal lateral fibula. The joint spaces are maintained. Overlying cast material is present.      A/P: 53 y/o Male with hx of L ankle ORIF on 6/22/19  - Cast removed  - D/w Dr. Coley- Recommends Skin to be cleansed and dried, dressed and CAM walker placed  - Skin cleansed in sterile fashion and scrubbed softly and dried  - Dressing applied to L foot  - CAM walker placed to LLE  - emphasized NWB to LLE, on crutches  - Pt is to remove the CAM walker when at home and resting and keep the area DRY   - Emphasized importance to keep the LLE DRY  - Pt is to follow up with Dr. Coley in one week for wound check  - If any fevers/chills/drainage or skin condition worsens to come to the ER immediately  - All questions answered Orthopedic Surgery Consult Note    55 y/o Male w/a hx of L ankle ORIF by Dr. Coley on 6/22/19. Pt was initially splinted and discharged the next day- he followed up with - last time was 2 weeks ago where the splint was removed and converted to a short leg cast. Pt states that for the past few days he has been having worsening L foot pain with associated tingling- denies any trauma/falls. Denies ambulating on the leg- he is NWB to LLE on crutches. Pt denies any tamia numbness/Cp/SOB/N/V/Fever/Chills.     Vital Signs Last 24 Hrs  T(C): 36.6 (08-13-19 @ 10:48), Max: 36.6 (08-13-19 @ 10:48)  T(F): 97.8 (08-13-19 @ 10:48), Max: 97.8 (08-13-19 @ 10:48)  HR: 84 (08-13-19 @ 10:48) (84 - 84)  BP: 123/80 (08-13-19 @ 10:48) (123/80 - 123/80)  BP(mean): --  RR: 20 (08-13-19 @ 10:48) (20 - 20)  SpO2: 97% (08-13-19 @ 10:48) (97% - 97%)    Physical Exam  Gen: NAD, sitting comfortably in a chair  LLE: Short Leg Cast- C/D/I- upon removal of the cast with a cast saw- the webril was noted to be soaked with water- pt denies getting the cast wet- states he was really careful in the shower- states he was sweating a lot due to the weather. Upon removal of the webril- there was noted to be wet and macerated skin over the heel, most of the plantar aspect foot, and lateral aspect of the foot. No erythema/ecchymosis or swelling, Medial and lateral incision sites clean and dry- healing well- steristrips intact, no drainage. Moves all toes, SILT, 2+ pulses, (+) motion to ankle.       Imaging:  XR: L ankle: Three views of the left ankle show surgical plates with fixation screws project along both the distal medial tibia and the distal lateral fibula. The joint spaces are maintained. Overlying cast material is present.      A/P: 55 y/o Male with hx of L ankle ORIF on 6/22/19  - Cast removed  - D/w Dr. Coley- Recommends Skin to be cleansed and dried, dressed and CAM walker placed  - Skin cleansed in sterile fashion and scrubbed softly and dried  - Dressing applied to L foot  - CAM walker placed to LLE  - Emphasized NWB to LLE, on crutches  - Pt is to remove the CAM walker when at home and resting and keep the area DRY   - Emphasized importance to keep the LLE DRY  - Pt is to follow up with Dr. Coley in one week for wound check  - If any fevers/chills/drainage or skin condition worsens, he must come to the ER immediately  - All questions answered Orthopedic Surgery Consult Note    53 y/o Male w/a hx of L ankle ORIF by Dr. Coley on 6/22/19. Pt was initially splinted and discharged the next day- he followed up with - last time was 2 weeks ago where the splint was removed and converted to a short leg cast. Pt states that for the past few days he has been having worsening L foot pain with associated tingling- denies any trauma/falls. Denies ambulating on the leg- he is NWB to LLE on crutches. Pt denies any tamia numbness/Cp/SOB/N/V/Fever/Chills.     Vital Signs Last 24 Hrs  T(C): 36.6 (08-13-19 @ 10:48), Max: 36.6 (08-13-19 @ 10:48)  T(F): 97.8 (08-13-19 @ 10:48), Max: 97.8 (08-13-19 @ 10:48)  HR: 84 (08-13-19 @ 10:48) (84 - 84)  BP: 123/80 (08-13-19 @ 10:48) (123/80 - 123/80)  BP(mean): --  RR: 20 (08-13-19 @ 10:48) (20 - 20)  SpO2: 97% (08-13-19 @ 10:48) (97% - 97%)    Physical Exam  Gen: NAD, sitting comfortably in a chair  LLE: Short Leg Cast- C/D/I- upon removal of the cast with a cast saw- the webril was noted to be soaked with water- pt denies getting the cast wet- states he was really careful in the shower- states he was sweating a lot due to the weather. Upon removal of the webril- there was noted to be wet and macerated skin over the heel, most of the plantar aspect foot, and lateral aspect of the foot. Skin intact to ankle- No erythema/ecchymosis or swelling, Medial and lateral incision sites clean and dry- healing well- steristrips intact, no drainage. Moves all toes, SILT, 2+ pulses, (+) motion to ankle.       Imaging:  XR: L ankle: Three views of the left ankle show surgical plates with fixation screws project along both the distal medial tibia and the distal lateral fibula. The joint spaces are maintained. Overlying cast material is present.      A/P: 53 y/o Male with hx of L ankle ORIF on 6/22/19  - Cast removed  - D/w Dr. Coley- Recommends Skin to be cleansed and dried, dressed and CAM walker placed  - Skin cleansed in sterile fashion with chg scrub and scrubbed softly and dried well  - Dressing applied to L foot  - CAM walker placed to LLE  - Emphasized NWB to LLE, on crutches  - Pt is to remove the CAM walker when at home and resting and keep the area DRY   - Emphasized importance to keep the LLE DRY  - Pt is to follow up with Dr. Coley in one week for wound check  - If any fevers/chills/drainage or skin condition worsens, he must come to the ER immediately  - All questions answered Orthopedic Surgery Consult Note    53 y/o Male w/a hx of L ankle ORIF by Dr. Coley on 6/22/19. Pt was initially splinted and discharged the next day- he followed up with - last time was 2 weeks ago where the splint was removed and converted to a short leg cast. Pt states that for the past few days he has been having worsening L foot pain with associated tingling- denies any trauma/falls. Denies ambulating on the leg- he is NWB to LLE on crutches. Pt denies any tamia numbness/Cp/SOB/N/V/Fever/Chills.     Vital Signs Last 24 Hrs  T(C): 36.6 (08-13-19 @ 10:48), Max: 36.6 (08-13-19 @ 10:48)  T(F): 97.8 (08-13-19 @ 10:48), Max: 97.8 (08-13-19 @ 10:48)  HR: 84 (08-13-19 @ 10:48) (84 - 84)  BP: 123/80 (08-13-19 @ 10:48) (123/80 - 123/80)  BP(mean): --  RR: 20 (08-13-19 @ 10:48) (20 - 20)  SpO2: 97% (08-13-19 @ 10:48) (97% - 97%)    Physical Exam  Gen: NAD, sitting comfortably in a chair  LLE: Short Leg Cast- C/D/I- upon removal of the cast with a cast saw- the webril was noted to be soaked with water- pt denies getting the cast wet- states he was really careful in the shower- states he was sweating a lot due to the weather. Upon removal of the webril- there was noted to be wet and macerated skin over the heel, most of the plantar aspect foot, and lateral aspect of the foot. Skin intact to ankle- No erythema/ecchymosis or swelling, Medial and lateral incision sites clean and dry- healing well- steristrips intact, no drainage. Moves all toes, SILT, 2+ pulses, (+) motion to ankle.       Imaging:  XR: L ankle: Three views of the left ankle show surgical plates with fixation screws project along both the distal medial tibia and the distal lateral fibula. The joint spaces are maintained. Overlying cast material is present.      A/P: 53 y/o Male with hx of L ankle ORIF on 6/22/19  - Cast removed  - D/w Dr. Coley- Recommends Skin to be cleansed and dried, dressed and CAM walker placed  - foot Skin I and D in sterile fashion with chg scrub and scrubbed softly and dried well  - Dressing applied to L foot  - CAM walker placed to LLE  - Emphasized NWB to LLE, on crutches  - Pt is to remove the CAM walker when at home and resting and keep the area DRY   - Emphasized importance to keep the LLE DRY  - Pt is to follow up with Dr. Coley in one week for wound check  - If any fevers/chills/drainage or skin condition worsens, he must come to the ER immediately  - All questions answered

## 2019-08-13 NOTE — ED ADULT NURSE NOTE - NSIMPLEMENTINTERV_GEN_ALL_ED
Implemented All Fall Risk Interventions:  [place] to call system. Call bell, personal items and telephone within reach. Instruct patient to call for assistance. Room bathroom lighting operational. Non-slip footwear when patient is off stretcher. Physically safe environment: no spills, clutter or unnecessary equipment. Stretcher in lowest position, wheels locked, appropriate side rails in place. Provide visual cue, wrist band, yellow gown, etc. Monitor gait and stability. Monitor for mental status changes and reorient to person, place, and time. Review medications for side effects contributing to fall risk. Reinforce activity limits and safety measures with patient and family.

## 2019-08-13 NOTE — ED PROVIDER NOTE - OBJECTIVE STATEMENT
53 y/o M with  PMHX  DM c/o ankle pain for 2 weeks and fever yesterday. Pt currently has cast on left ankle s/p fall  after surgery ( 6/6/19) and c/o loss of sensation in toes occasionally. Pt states cast it too tight and c/o that left leg is colder than right. New cast was placed on 7/31/19.

## 2019-08-13 NOTE — ED ADULT NURSE NOTE - OBJECTIVE STATEMENT
c/o left ankle pain , toes feels numb ,patient with left short leg cast applied 2 weeks ago , toes mobile color pink. c/o left ankle pain , toes feels numb ,patient with left short leg cast applied 2 weeks ago , toes mobile color pink .Evaluated by orthopedic consult. Cast removed by orthopedic consult. Left ankle ,left lower sutures in place ,intact steri strips in place. Left ankle dressings applied .Ambulates with crutches.

## 2019-08-13 NOTE — ED PROVIDER NOTE - CLINICAL SUMMARY MEDICAL DECISION MAKING FREE TEXT BOX
55 y/o M with PMHx GERD, DM c/o left ankle pain due to new cast placement. Will consult ortho. X-ray.   Ortho PA was paged. Reassess. 55 y/o M with PMHx GERD, DM c/o left ankle pain due to new cast placement. Will consult ortho. X-ray.   Ortho PA was paged. Reassess.  Ortho cleared patient will d/c without case

## 2019-08-13 NOTE — CONSULT NOTE ADULT - ATTENDING COMMENTS
pt seeen and evaluated, cast removed, macerated skin on  foot, required I and d at bedside to remove dead skin and debris.  done gently without difficulty.  xray reveals hardware and fracture intact / healing.  cont nwb f/u in 1 week in office

## 2020-11-03 NOTE — ED PROVIDER NOTE - MUSCULOSKELETAL, MLM
Spine appears normal, range of motion is not limited. Puncture wound to the distal lateral foot. 291.8

## 2020-11-20 ENCOUNTER — EMERGENCY (EMERGENCY)
Facility: HOSPITAL | Age: 55
LOS: 1 days | Discharge: HOME | End: 2020-11-20
Attending: EMERGENCY MEDICINE
Payer: MEDICAID

## 2020-11-20 VITALS
RESPIRATION RATE: 16 BRPM | SYSTOLIC BLOOD PRESSURE: 117 MMHG | HEIGHT: 68 IN | WEIGHT: 231.49 LBS | DIASTOLIC BLOOD PRESSURE: 82 MMHG | TEMPERATURE: 98 F | HEART RATE: 88 BPM | OXYGEN SATURATION: 97 %

## 2020-11-20 PROCEDURE — 99283 EMERGENCY DEPT VISIT LOW MDM: CPT

## 2020-11-20 PROCEDURE — U0003: CPT

## 2020-11-20 NOTE — ED PROVIDER NOTE - PATIENT PORTAL LINK FT
You can access the FollowMyHealth Patient Portal offered by [facility] by registering at the following website: [email or web address]. By joining [employer]’s FollowMyHealth portal, you will also be able to view your health information using other applications (apps) compatible with our system.

## 2020-11-20 NOTE — ED PROVIDER NOTE - OBJECTIVE STATEMENT
Patient presenting to ED for COVID testing. Patient states his daughter was tested 2 days ago and tested positive. He denies Cough, URi symptoms, fevers, chills or other complaints. He reports he is at baseline without issue.

## 2020-11-20 NOTE — ED ADULT TRIAGE NOTE - CHIEF COMPLAINT QUOTE
My daughter tested positive for COVID-19 yesterday.  I do not have any symptoms, I need a COVID-19 PCR test

## 2020-11-21 LAB — SARS-COV-2 RNA SPEC QL NAA+PROBE: SIGNIFICANT CHANGE UP

## 2020-12-20 ENCOUNTER — EMERGENCY (EMERGENCY)
Facility: HOSPITAL | Age: 55
LOS: 1 days | Discharge: HOME | End: 2020-12-20
Attending: EMERGENCY MEDICINE
Payer: MEDICAID

## 2020-12-20 VITALS
RESPIRATION RATE: 16 BRPM | HEART RATE: 88 BPM | TEMPERATURE: 98 F | DIASTOLIC BLOOD PRESSURE: 74 MMHG | SYSTOLIC BLOOD PRESSURE: 126 MMHG | WEIGHT: 235.01 LBS | OXYGEN SATURATION: 97 % | HEIGHT: 68 IN

## 2020-12-20 PROCEDURE — 99283 EMERGENCY DEPT VISIT LOW MDM: CPT

## 2020-12-20 PROCEDURE — U0003: CPT

## 2020-12-20 NOTE — ED PROVIDER NOTE - NSFOLLOWUPINSTRUCTIONS_ED_ALL_ED_FT
Today you will tested for the COVID-19 virus. If you test positive you have to isolate yourself for the next 10 days and then you can come out of isolation as long as you are 3 days without fever (while not taking any medications for fever).    *Having a negative test doesn't guarantee 100% that you don't have COVID especially if you may have contracted the virus in the last few days. It may take time to show on the test. The best thing to do if you are concerned is to isolate yourself.    For pain or fever you can take: Tylenol 1000 mg orally every 6 hours as needed. (Maximum 4000 mg in 24 hours).    Stay well-hydrated by drinking plenty of water daily.    If you have mild-moderate difficulty breathing, try laying on you stomache or on your side. It usually helps with the breathing.    ** [facility] if you start having shortness of breathe, if your symptoms are worsening or if you are concerned. Otherwise stay home and isolate yourself.    **If you have friends or family members that have mild symptoms that may be due to the virus tell them to stay home    Stay home: People who are mildly ill with COVID-19 are able to recover at home. Do not leave, except to get medical care. Do not visit public areas.  Stay in touch with your doctor. Call before you get medical care. Be sure to get care if you feel worse or you think it is an emergency.  Avoid public transportation: Avoid using public transportation, ride-sharing, or taxis.  Stay away from others: As much as possible, you should stay in a specific “sick room” and away from other people in your home. Use a separate bathroom, if available.  Limit contact with pets & animals: You should restrict contact with pets and other animals, just like you would around other people.  Although there have not been reports of pets or other animals becoming sick with COVID-19, it is still recommended that people with the virus limit contact with animals until more information is known.  If you are sick: You should wear a facemask when you are around other people and before you enter a healthcare provider’s office.  If you are caring for others: If the person who is sick is not able to wear a facemask (for example, because it causes trouble breathing), then people who live in the home should stay in a different room. When caregivers enter the room of the sick person, they should wear a facemask. Visitors, other than caregivers, are not recommended.  Cover: Cover your mouth and nose with a tissue when you cough or sneeze.  Dispose: Throw used tissues in a lined trash can.  Wash hands: Immediately wash your hands with soap and water for at least 20 seconds. If soap and water are not available, clean your hands with an alcohol-based hand  that contains at least 60% alcohol.  Hand : If soap and water are not available, use an alcohol-based hand  with at least 60% alcohol, covering all surfaces of your hands and rubbing them together until they feel dry.  Soap and water: Soap and water are the best option, especially if hands are visibly dirty.  Avoid touching: Avoid touching your eyes, nose, and mouth with unwashed hands.  Do not share: Do not share dishes, drinking glasses, cups, eating utensils, towels, or bedding with other people in your home.  Wash thoroughly after use: After using these items, wash them thoroughly with soap and water or put in the .  Clean and disinfect: Routinely clean high-touch surfaces in your “sick room” and bathroom. Let someone else clean and disinfect surfaces in common areas, but not your bedroom and bathroom.  If a caregiver or other person needs to clean and disinfect a sick person’s bedroom or bathroom, they should do so on an as-needed basis. The caregiver/other person should wear a mask and wait as long as possible after the sick person has used the bathroom.  High-touch surfaces include phones, remote controls, counters, tabletops, doorknobs, bathroom fixtures, toilets, keyboards, tablets, and bedside tables.  Clean and disinfect areas that may have blood, stool, or body fluids on them.  Seek medical attention, but call first: Seek medical care right away if your illness is worsening (for example, if you have difficulty breathing).  Call your doctor before going in: Before going to the doctor’s office or emergency room, call ahead and tell them your symptoms. They will tell you what to do.  Wear a facemask: If possible, put on a facemask before you enter the building. If you can’t put on a facemask, try to keep a safe distance from other people (at least 6 feet away). This will help protect the people in the office or waiting room.  Follow care instructions from your healthcare provider and local health department: Your local health authorities will give instructions on checking your symptoms and reporting information.  Call 911 if you have a medical emergency: If you have a medical emergency and need to call 911, notify the  that you have or think you might have, COVID-19. If possible, put on a facemask before medical help arrives.

## 2020-12-20 NOTE — ED PROVIDER NOTE - PHYSICAL EXAMINATION
CONSTITUTIONAL: Well appearing, awake, alert, oriented to person, place, time/situation and in no apparent distress. EYES: Clear bilaterally, pupils equal, round and reactive to light. CARDIAC: Normal rate, regular rhythm.  RESPIRATORY: Breathing with ease, no use of accessory muscles. NEUROLOGICAL: Alert and oriented, no focal deficits, grossly intact. SKIN: Skin normal color for race, warm, dry and intact. No evidence of rash.

## 2020-12-20 NOTE — ED PROVIDER NOTE - PROGRESS NOTE DETAILS
Follow up with the PMD tomorrow for re-eval. Pt is well appearing walking with steady gait, stable for discharge and follow up without fail with medical doctor. I had a detailed discussion with the patient and/or guardian regarding the historical points, exam findings, and any diagnostic results supporting the discharge diagnosis. Pt educated on care and need for follow up. Strict return instructions and red flag signs and symptoms discussed with patient. Questions answered. Pt shows understanding of discharge information and agrees to follow.

## 2020-12-20 NOTE — ED PROVIDER NOTE - CLINICAL SUMMARY MEDICAL DECISION MAKING FREE TEXT BOX
55 year-old male, presents for COVID-19 testing. Reports having mild intermittent headache x 2-3 days that resolves with Tylenol. Neuro exam grossly intact. Also reported L sided chest discomfort but retracted it right away. Offered ECG, lab work and XR but patient reports "I don't need it, I don't have chest pain, I just want to be tested for COVID-19". Well-appearing, vital signs within normal limits, afebrile. Will dc with PMD follow up tomorrow.

## 2020-12-20 NOTE — ED PROVIDER NOTE - CARE PLAN
Principal Discharge DX:	Encounter for laboratory testing for COVID-19 virus  Secondary Diagnosis:	Acute nonintractable headache, unspecified headache type

## 2020-12-20 NOTE — ED PROVIDER NOTE - OBJECTIVE STATEMENT
55 year-old male, history of smoking cigarettes, DM, presents with cc "I want to be tested for COVID-19". Reports that for the last 2-3 days has been having gradual onset nonexertional mild intermittent headache, relieved with Tylenol. Also reported L sided chest discomfort but right away after said "I actually don't have chest pain I only want the test".  Denies any fever, chills, body aches, cough, dizziness, SOB, LE swelling, or any other complaints.

## 2020-12-20 NOTE — ED PROVIDER NOTE - PATIENT PORTAL LINK FT
You can access the FollowMyHealth Patient Portal offered by [facility] by registering at the following website: [email or web address]. By joining [employer]’s FollowMyHealth portal, you will also be able to view your health information using other applications (apps) compatible with our system.

## 2020-12-20 NOTE — ED ADULT NURSE NOTE - CAS ELECT INFOMATION PROVIDED
pt evaluated, treated and discharged by [place] NP. swabbed for COVID19 test. No other nursing intervention needed./DC instructions

## 2020-12-21 LAB — SARS-COV-2 RNA SPEC QL NAA+PROBE: DETECTED

## 2021-06-01 ENCOUNTER — TRANSCRIPTION ENCOUNTER (OUTPATIENT)
Age: 56
End: 2021-06-01

## 2021-11-12 ENCOUNTER — EMERGENCY (EMERGENCY)
Facility: HOSPITAL | Age: 56
LOS: 1 days | Discharge: HOME | End: 2021-11-12
Attending: STUDENT IN AN ORGANIZED HEALTH CARE EDUCATION/TRAINING PROGRAM
Payer: MEDICAID

## 2021-11-12 VITALS
WEIGHT: 220.02 LBS | HEIGHT: 68 IN | OXYGEN SATURATION: 98 % | TEMPERATURE: 98 F | HEART RATE: 78 BPM | RESPIRATION RATE: 17 BRPM | DIASTOLIC BLOOD PRESSURE: 70 MMHG | SYSTOLIC BLOOD PRESSURE: 122 MMHG

## 2021-11-12 PROCEDURE — 99282 EMERGENCY DEPT VISIT SF MDM: CPT

## 2021-11-12 NOTE — ED PROVIDER NOTE - PATIENT PORTAL LINK FT
You can access the FollowMyHealth Patient Portal offered by [facility] by registering at the following website: [email or web address]. By joining [employer]’s FollowMyHealth portal, you will also be able to view your health information using other applications (apps) compatible with our system.

## 2021-11-12 NOTE — ED PROVIDER NOTE - NS ED ROS FT
Patient Reports No  Fever/Chills  Nausea/Vomiting/Diarrhea  Chest Pain, Shortness of Breath  Other Recent Illness or Hospitalizations

## 2021-11-12 NOTE — ED PROVIDER NOTE - OBJECTIVE STATEMENT
55 yo M [facility] p/w request for COVID test for travel. Pt denies any recent symptoms.

## 2021-11-12 NOTE — ED PROVIDER NOTE - NSFOLLOWUPINSTRUCTIONS_ED_ALL_ED_FT
You were seen in the emergency room today for a COVID test.    Please call your primary doctor to inform them of this ER visit and obtain the next available appointment within the next 5 days. As we discussed, return to the ER if you have any worsening symptoms.    While we have determined that you are currently stable for discharge, we know that things can change. Please seek immediate medical attention or return to the ER if you experience any of the following:  Any worsening or persistent symptoms  Severe Pain  Chest Pain  Difficulty Breathing  Bleeding  Passing Out  Severe Rash  Inability to Eat or Drink  Persistent Fever    Please see a primary care doctor or specialist within 5 days to ensure that you are improving.    Please call the [facility] phone numbers on this document if you have any problems obtaining a follow up appointment.    I wish you well! -Dr Greene

## 2021-11-13 LAB — SARS-COV-2 RNA SPEC QL NAA+PROBE: SIGNIFICANT CHANGE UP

## 2021-11-13 PROCEDURE — 87635 SARS-COV-2 COVID-19 AMP PRB: CPT

## 2021-11-13 PROCEDURE — 99283 EMERGENCY DEPT VISIT LOW MDM: CPT

## 2021-11-13 NOTE — ED ADULT NURSE NOTE - NSIMPLEMENTINTERV_GEN_ALL_ED
Implemented All Universal Safety Interventions:  [place] to call system. Call bell, personal items and telephone within reach. Instruct patient to call for assistance. Room bathroom lighting operational. Non-slip footwear when patient is off stretcher. Physically safe environment: no spills, clutter or unnecessary equipment. Stretcher in lowest position, wheels locked, appropriate side rails in place.

## 2021-11-29 ENCOUNTER — EMERGENCY (EMERGENCY)
Facility: HOSPITAL | Age: 56
LOS: 1 days | Discharge: LEFT BEFORE DISCHARGE | End: 2021-11-29
Attending: EMERGENCY MEDICINE
Payer: MEDICAID

## 2021-11-29 VITALS
DIASTOLIC BLOOD PRESSURE: 78 MMHG | RESPIRATION RATE: 18 BRPM | HEIGHT: 68 IN | TEMPERATURE: 99 F | WEIGHT: 220.02 LBS | OXYGEN SATURATION: 97 % | HEART RATE: 96 BPM | SYSTOLIC BLOOD PRESSURE: 132 MMHG

## 2021-11-29 PROCEDURE — L9991: CPT

## 2021-11-29 NOTE — ED ADULT NURSE NOTE - NS ED NURSE ELOPE COMMENTS
Patient told he might be billed for ED visit and left without being tested for Covid. Ambulatory, no acute distress noted.

## 2021-11-29 NOTE — ED ADULT NURSE NOTE - CHIEF COMPLAINT QUOTE
Pt stated he just returned from [place] yesterday denies having any symptoms just wants to just to make sure he wants a covid test

## 2021-11-29 NOTE — ED ADULT TRIAGE NOTE - CHIEF COMPLAINT QUOTE
Pt stated he just returned from [place] yesterday denies having any symptoms just wants to just to make sure he wants a covid test

## 2022-11-29 ENCOUNTER — TRANSCRIPTION ENCOUNTER (OUTPATIENT)
Age: 57
End: 2022-11-29

## 2023-03-03 NOTE — ED ADULT TRIAGE NOTE - SPO2 (%)
Stop Enoxaparin injections now that INR is therapeutic. Follow the new dosing instructions on the calendar. You will now only be utilizing your 5mg tablets that you have at home and follow the instructions on the calendar. Please store your 1mg tablets away as you will no longer be using them at this time.   
97

## 2023-06-08 NOTE — PROGRESS NOTE ADULT - REASON FOR ADMISSION
Left ankle fracture Render Note In Bullet Format When Appropriate: No Show Aperture Variable?: Yes Consent: The patient's consent was obtained including but not limited to risks of crusting, scabbing, blistering, scarring, darker or lighter pigmentary change, recurrence, incomplete removal and infection. Duration Of Freeze Thaw-Cycle (Seconds): 0 Post-Care Instructions: I reviewed with the patient in detail post-care instructions. Patient is to wear sunprotection, and avoid picking at any of the treated lesions. Pt may apply Vaseline to crusted or scabbing areas. Detail Level: Detailed

## 2023-10-24 NOTE — PATIENT PROFILE ADULT - BRADEN MOBILITY
Pt's mom called to inquire about echo results. Advised that they have not resulted yet but that I would call her when they did. Pt's mother vu all.   (3) slightly limited

## 2024-01-01 NOTE — CONSULT NOTE ADULT - ASSESSMENT
54 years old male with PMH of NIDDM p/w left ankle fracture from mechanical fall. Medicine team was called for pre-op medical risk stratification. -Check with your Pediatrician before giving any medications to your baby.

## 2024-03-27 NOTE — PACU DISCHARGE NOTE - HYDRATION STATUS:
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  This should be evaluated by a cardiologist, please send referral.
Satisfactory
